# Patient Record
Sex: MALE | URBAN - METROPOLITAN AREA
[De-identification: names, ages, dates, MRNs, and addresses within clinical notes are randomized per-mention and may not be internally consistent; named-entity substitution may affect disease eponyms.]

---

## 2024-03-14 ENCOUNTER — TELEPHONE (OUTPATIENT)
Dept: CARDIAC SURGERY | Facility: CLINIC | Age: 79
End: 2024-03-14

## 2024-03-14 RX ORDER — GLIPIZIDE 5 MG/1
5 TABLET ORAL
COMMUNITY

## 2024-03-14 RX ORDER — FUROSEMIDE 40 MG/1
40 TABLET ORAL 2 TIMES DAILY
COMMUNITY

## 2024-03-14 RX ORDER — POTASSIUM CHLORIDE 750 MG/1
10 TABLET, FILM COATED, EXTENDED RELEASE ORAL DAILY
COMMUNITY

## 2024-03-14 RX ORDER — ASPIRIN 81 MG/1
81 TABLET, CHEWABLE ORAL DAILY
COMMUNITY

## 2024-03-14 RX ORDER — ATORVASTATIN CALCIUM 40 MG/1
40 TABLET, FILM COATED ORAL DAILY
COMMUNITY

## 2024-03-14 RX ORDER — GABAPENTIN 300 MG/1
300 CAPSULE ORAL 2 TIMES DAILY
COMMUNITY

## 2024-03-14 RX ORDER — PANTOPRAZOLE SODIUM 40 MG/1
40 TABLET, DELAYED RELEASE ORAL DAILY
COMMUNITY

## 2024-03-14 RX ORDER — METOPROLOL SUCCINATE 25 MG/1
25 TABLET, EXTENDED RELEASE ORAL DAILY
COMMUNITY

## 2024-03-14 RX ORDER — MECLIZINE HYDROCHLORIDE 25 MG/1
25 TABLET ORAL 3 TIMES DAILY PRN
COMMUNITY

## 2024-03-14 RX ORDER — TAMSULOSIN HYDROCHLORIDE 0.4 MG/1
0.4 CAPSULE ORAL 2 TIMES DAILY
COMMUNITY

## 2024-03-14 RX ORDER — FINASTERIDE 5 MG/1
5 TABLET, FILM COATED ORAL DAILY
COMMUNITY

## 2024-03-21 ENCOUNTER — OFFICE VISIT (OUTPATIENT)
Dept: CARDIAC SURGERY | Facility: CLINIC | Age: 79
End: 2024-03-21

## 2024-03-21 VITALS
SYSTOLIC BLOOD PRESSURE: 129 MMHG | HEIGHT: 73 IN | BODY MASS INDEX: 27.9 KG/M2 | TEMPERATURE: 97.8 F | WEIGHT: 210.5 LBS | OXYGEN SATURATION: 100 % | DIASTOLIC BLOOD PRESSURE: 60 MMHG | HEART RATE: 56 BPM

## 2024-03-21 DIAGNOSIS — Z71.89 ENCOUNTER FOR DISCUSSION REGARDING IMPLANTABLE CARDIOVERTER-DEFIBRILLATOR (ICD): ICD-10-CM

## 2024-03-21 DIAGNOSIS — Z95.0 CARDIAC PACEMAKER: ICD-10-CM

## 2024-03-21 DIAGNOSIS — I50.20 HFREF (HEART FAILURE WITH REDUCED EJECTION FRACTION) (HCC): Primary | ICD-10-CM

## 2024-03-21 DIAGNOSIS — Z01.810 PRE-OPERATIVE CARDIOVASCULAR EXAMINATION: ICD-10-CM

## 2024-03-21 DIAGNOSIS — Z01.812 ENCOUNTER FOR PRE-OPERATIVE LABORATORY TESTING: ICD-10-CM

## 2024-03-21 PROCEDURE — 99204 OFFICE O/P NEW MOD 45 MIN: CPT | Performed by: THORACIC SURGERY (CARDIOTHORACIC VASCULAR SURGERY)

## 2024-03-21 RX ORDER — CHLORHEXIDINE GLUCONATE ORAL RINSE 1.2 MG/ML
15 SOLUTION DENTAL ONCE
OUTPATIENT
Start: 2024-03-21 | End: 2024-03-21

## 2024-03-21 RX ORDER — CEFAZOLIN SODIUM 2 G/50ML
2000 SOLUTION INTRAVENOUS ONCE
OUTPATIENT
Start: 2024-03-21 | End: 2024-03-21

## 2024-03-21 NOTE — PROGRESS NOTES
Consultation - Cardiothoracic Surgery   Rey Sweeney 78 y.o. male MRN: [id number]    Physician Requesting Consult: Dr. Rosario, Dr. Osei Velasco     Reason for Consult / Principal Problem: Lead extraction, device upgrade.     History of Present Illness: Rey Sweeney is a 78 y.o. year old male with PMHx Afib (low burden of Afib on PPM, therefore not on AC), HTN, HLD, CKD3 (follows with Nephrology in [place]), severe aortic stenosis, s/p AVR and MVR in 2017 at [facility], Type 2 DM, GERD, CHB s/p PPM placement in 2017, s/p generator change in April 2023 due to battery at EOL, dilated cardiomyopathy previously well compensated with an EF of 40-45%, now significantly reduced at 25-30% over two years, who has been referred for device upgrade to BiV ICD. Patient is followed closely by his cardiologist at [place], Dr. Velasco, and as he has had a reduced EF, there have been discussions about upgrading his device. Patient was previously undergoing radiation treatment and hormone therapy for his prostate cancer, which finished at the end of 2023, so he wanted to wait until that was completed.     Upon examination today, patient confirms above details. Currently he states that he experiences dizziness on occasion with position changes and lower extremity edema. He has been trialed on Entresto, but this was stopped due to patient's side effects. He is now on Furosemide, which has improved his symptoms of shortness of breath and lower extremity swelling. He lives in [place], and gets the majority of his care in that area and the [facility]. He lives in a home with his step-son. He states that he lost his wife to pancreatic cancer about 3 years ago. He drives and performs his own ADL's, walks without assistance. He is a retired . He is compliant with his medical follow ups and his medication regimen.       Past Medical History:  Past Medical History:   Diagnosis Date    Atrial fibrillation  (HCC)     Cancer (HCC)     Diabetes (HCC)     Diabetes mellitus (HCC)     Dilated cardiomyopathy (HCC)     GERD (gastroesophageal reflux disease)     Hyperlipidemia     Hypertension     Kidney disease     Paroxysmal tachycardia (HCC)     Prostate cancer (HCC)     Severe aortic stenosis          Past Surgical History:   Past Surgical History:   Procedure Laterality Date    AORTIC VALVE REPLACEMENT  2017    CARDIAC CATHETERIZATION Right 2017    CARDIAC PACEMAKER PLACEMENT      MITRAL VALVE REPLACEMENT  2017         Family History:  Family History   Problem Relation Age of Onset    Cancer Mother     Heart Valve Disease Mother          Social History:      Social History     Substance and Sexual Activity   Alcohol Use Never     Social History     Substance and Sexual Activity   Drug Use Never     Social History     Tobacco Use   Smoking Status Former    Types: Cigarettes   Smokeless Tobacco Not on file       Home Medications:   Prior to Admission medications    Medication Sig Start Date End Date Taking? Authorizing Provider   aspirin 81 mg chewable tablet Chew 81 mg daily   Yes Historical Provider, MD   atorvastatin (LIPITOR) 40 mg tablet Take 40 mg by mouth daily   Yes Historical Provider, MD   finasteride (PROSCAR) 5 mg tablet Take 5 mg by mouth daily   Yes Historical Provider, MD   furosemide (LASIX) 40 mg tablet Take 40 mg by mouth 2 (two) times a day   Yes Historical Provider, MD   gabapentin (NEURONTIN) 300 mg capsule Take 300 mg by mouth 2 (two) times a day   Yes Historical Provider, MD   glipiZIDE (GLUCOTROL) 5 mg tablet Take 5 mg by mouth 2 (two) times a day before meals   Yes Historical Provider, MD   metoprolol succinate (TOPROL-XL) 25 mg 24 hr tablet Take 25 mg by mouth daily   Yes Historical Provider, MD   Multiple Vitamins-Minerals (MULTIVITAMIN MEN 50+ PO) Take by mouth   Yes Historical Provider, MD   pantoprazole (PROTONIX) 40 mg tablet Take 40 mg by mouth daily   Yes Historical Provider, MD   potassium  "chloride (Klor-Con) 10 mEq tablet Take 10 mEq by mouth daily   Yes Historical Provider, MD   tamsulosin (FLOMAX) 0.4 mg Take 0.4 mg by mouth 2 (two) times a day   Yes Historical Provider, MD   meclizine (ANTIVERT) 25 mg tablet Take 25 mg by mouth 3 (three) times a day as needed for dizziness  Patient not taking: Reported on 3/21/2024    Historical Provider, MD       Allergies:  Allergies   Allergen Reactions    Penicillins Nasal Congestion     Gets a stuffy nose and cold like symptom        Review of Systems:     Review of Systems   Constitutional: Negative.  Negative for chills and fever.   HENT:  Negative for ear pain and sore throat.    Eyes: Negative.  Negative for pain and visual disturbance.   Respiratory:  Negative for cough and shortness of breath.    Cardiovascular:  Positive for leg swelling. Negative for chest pain and palpitations.   Gastrointestinal:  Negative for abdominal pain and vomiting.   Genitourinary:  Negative for dysuria and hematuria.   Musculoskeletal:  Negative for arthralgias and back pain.   Skin:  Negative for color change and rash.   Neurological:  Positive for dizziness and light-headedness. Negative for seizures and syncope.   All other systems reviewed and are negative.      Vital Signs:     Vitals:    03/21/24 1024 03/21/24 1027   BP: 134/69 129/60   BP Location: Right arm Left arm   Patient Position: Sitting Sitting   Cuff Size: Standard Standard   Pulse: 56    Temp: 97.8 °F (36.6 °C)    TempSrc: Tympanic    SpO2: 100%    Weight: 95.5 kg (210 lb 8 oz)    Height: 6' 1\" (1.854 m)        Physical Exam:    Physical Exam  Vitals reviewed.   Constitutional:       Appearance: Normal appearance.   HENT:      Head: Normocephalic and atraumatic.   Eyes:      Pupils: Pupils are equal, round, and reactive to light.   Neck:      Vascular: No carotid bruit or JVD.   Cardiovascular:      Rate and Rhythm: Normal rate and regular rhythm.      Pulses:           Carotid pulses are 2+ on the right " "side and 2+ on the left side.       Radial pulses are 2+ on the right side and 2+ on the left side.        Dorsalis pedis pulses are 2+ on the right side and 2+ on the left side.      Heart sounds: Normal heart sounds. No murmur heard.     No friction rub. No gallop.   Pulmonary:      Effort: Pulmonary effort is normal.      Breath sounds: Normal breath sounds. No wheezing, rhonchi or rales.   Abdominal:      Palpations: Abdomen is soft.      Tenderness: There is no abdominal tenderness.   Musculoskeletal:      Cervical back: Normal range of motion.      Right lower le+ Edema present.      Left lower le+ Edema present.   Skin:     General: Skin is warm and dry.      Capillary Refill: Capillary refill takes less than 2 seconds.   Neurological:      General: No focal deficit present.      Mental Status: He is alert.      Sensory: Sensation is intact.   Psychiatric:         Attention and Perception: Attention normal.         Mood and Affect: Mood normal.         Behavior: Behavior normal. Behavior is cooperative.         Lab Results:               Invalid input(s): \"LABGLOM\"      No results found for: \"HGBA1C\"  No results found for: \"CKTOTAL\", \"CKMB\", \"CKMBINDEX\", \"TROPONINI\"    Imaging Studies:     Echocardiogram: 23 @ [facility]   EF 25-30%  Prosthetic mitral valve with normal function   Normally functioning bioprosthetic aortic valve without aortic regurgitation  Mild tricuspid regurgitation   Pacer wire visualized in RA and RV  Aortic root normal     I have personally reviewed pertinent reports.  I have also reviewed records from Dr. Lofton at [facility], echocardiograms and EKG's.       Assessment:  History of AVR and MV repair  PAF - not on AC   PPM in situ, s/p generator change 2023  Cardiomyopathy, need for PPM upgrade to BiV ICD.     Plan:  Risks and benefits of lead extraction, BiV ICD upgrade were discussed in detail today with the patient.  They understand and wish " to proceed with further workup and ultimately surgical intervention.  We have ordered routine preoperative laboratory and vascular studies.  Pending the results of these tests, they will be scheduled for surgery on 4/5/24 with Dr. Trell Quezada M.D.    Rey Sweeney was comfortable with our recommendations, and their questions were answered to their satisfaction.  Thank you for allowing us to participate in the care of this patient.     The patient recently had a screening colonoscopy in 2023.  Therefore GI referral is not indicated at this time.     SIGNATURE: Ria Gutierrez PA-C  DATE: 03/21/24  TIME: 10:32 AM

## 2024-03-21 NOTE — H&P
Preop History and Physical - Cardiothoracic Surgery   Rey Sweeney 78 y.o. male MRN: [id number]    Physician Requesting Consult: Dr. Rosario, Dr. Osei Vealsco     Reason for Consult / Principal Problem: Lead extraction, device upgrade.     History of Present Illness: Rey Sweeney is a 78 y.o. year old male with PMHx Afib (low burden of Afib on PPM, therefore not on AC), HTN, HLD, CKD3 (follows with Nephrology in [place]), severe aortic stenosis, s/p AVR and MVR in 2017 at [facility], Type 2 DM, GERD, CHB s/p PPM placement in 2017, s/p generator change in April 2023 due to battery at EOL, dilated cardiomyopathy previously well compensated with an EF of 40-45%, now significantly reduced at 25-30% over two years, who has been referred for device upgrade to BiV ICD. Patient is followed closely by his cardiologist at [place], Dr. Velasco, and as he has had a reduced EF, there have been discussions about upgrading his device. Patient was previously undergoing radiation treatment and hormone therapy for his prostate cancer, which finished at the end of 2023, so he wanted to wait until that was completed.     Upon examination today, patient confirms above details. Currently he states that he experiences dizziness on occasion with position changes and lower extremity edema. He has been trialed on Entresto, but this was stopped due to patient's side effects. He is now on Furosemide, which has improved his symptoms of shortness of breath and lower extremity swelling. He lives in [place], and gets the majority of his care in that area and the [facility]. He lives in a home with his step-son. He states that he lost his wife to pancreatic cancer about 3 years ago. He drives and performs his own ADL's, walks without assistance. He is a retired . He is compliant with his medical follow ups and his medication regimen.       Past Medical History:  Past Medical History:   Diagnosis Date    Atrial  fibrillation (HCC)     Cancer (HCC)     Diabetes (HCC)     Diabetes mellitus (HCC)     Dilated cardiomyopathy (HCC)     GERD (gastroesophageal reflux disease)     Hyperlipidemia     Hypertension     Kidney disease     Paroxysmal tachycardia (HCC)     Prostate cancer (HCC)     Severe aortic stenosis          Past Surgical History:   Past Surgical History:   Procedure Laterality Date    AORTIC VALVE REPLACEMENT  2017    CARDIAC CATHETERIZATION Right 2017    CARDIAC PACEMAKER PLACEMENT      MITRAL VALVE REPLACEMENT  2017         Family History:  Family History   Problem Relation Age of Onset    Cancer Mother     Heart Valve Disease Mother          Social History:      Social History     Substance and Sexual Activity   Alcohol Use Never     Social History     Substance and Sexual Activity   Drug Use Never     Social History     Tobacco Use   Smoking Status Former    Types: Cigarettes   Smokeless Tobacco Not on file       Home Medications:   Prior to Admission medications    Medication Sig Start Date End Date Taking? Authorizing Provider   aspirin 81 mg chewable tablet Chew 81 mg daily   Yes Historical Provider, MD   atorvastatin (LIPITOR) 40 mg tablet Take 40 mg by mouth daily   Yes Historical Provider, MD   finasteride (PROSCAR) 5 mg tablet Take 5 mg by mouth daily   Yes Historical Provider, MD   furosemide (LASIX) 40 mg tablet Take 40 mg by mouth 2 (two) times a day   Yes Historical Provider, MD   gabapentin (NEURONTIN) 300 mg capsule Take 300 mg by mouth 2 (two) times a day   Yes Historical Provider, MD   glipiZIDE (GLUCOTROL) 5 mg tablet Take 5 mg by mouth 2 (two) times a day before meals   Yes Historical Provider, MD   metoprolol succinate (TOPROL-XL) 25 mg 24 hr tablet Take 25 mg by mouth daily   Yes Historical Provider, MD   Multiple Vitamins-Minerals (MULTIVITAMIN MEN 50+ PO) Take by mouth   Yes Historical Provider, MD   pantoprazole (PROTONIX) 40 mg tablet Take 40 mg by mouth daily   Yes Historical Provider, MD  "  potassium chloride (Klor-Con) 10 mEq tablet Take 10 mEq by mouth daily   Yes Historical Provider, MD   tamsulosin (FLOMAX) 0.4 mg Take 0.4 mg by mouth 2 (two) times a day   Yes Historical Provider, MD   meclizine (ANTIVERT) 25 mg tablet Take 25 mg by mouth 3 (three) times a day as needed for dizziness  Patient not taking: Reported on 3/21/2024    Historical Provider, MD       Allergies:  Allergies   Allergen Reactions    Penicillins Nasal Congestion     Gets a stuffy nose and cold like symptom        Review of Systems:     Review of Systems   Constitutional: Negative.  Negative for chills and fever.   HENT:  Negative for ear pain and sore throat.    Eyes: Negative.  Negative for pain and visual disturbance.   Respiratory:  Negative for cough and shortness of breath.    Cardiovascular:  Positive for leg swelling. Negative for chest pain and palpitations.   Gastrointestinal:  Negative for abdominal pain and vomiting.   Genitourinary:  Negative for dysuria and hematuria.   Musculoskeletal:  Negative for arthralgias and back pain.   Skin:  Negative for color change and rash.   Neurological:  Positive for dizziness and light-headedness. Negative for seizures and syncope.   All other systems reviewed and are negative.      Vital Signs:     Vitals:    03/21/24 1024 03/21/24 1027   BP: 134/69 129/60   BP Location: Right arm Left arm   Patient Position: Sitting Sitting   Cuff Size: Standard Standard   Pulse: 56    Temp: 97.8 °F (36.6 °C)    TempSrc: Tympanic    SpO2: 100%    Weight: 95.5 kg (210 lb 8 oz)    Height: 6' 1\" (1.854 m)        Physical Exam:    Physical Exam  Vitals reviewed.   Constitutional:       Appearance: Normal appearance.   HENT:      Head: Normocephalic and atraumatic.   Eyes:      Pupils: Pupils are equal, round, and reactive to light.   Neck:      Vascular: No carotid bruit or JVD.   Cardiovascular:      Rate and Rhythm: Normal rate and regular rhythm.      Pulses:           Carotid pulses are 2+ on " "the right side and 2+ on the left side.       Radial pulses are 2+ on the right side and 2+ on the left side.        Dorsalis pedis pulses are 2+ on the right side and 2+ on the left side.      Heart sounds: Normal heart sounds. No murmur heard.     No friction rub. No gallop.   Pulmonary:      Effort: Pulmonary effort is normal.      Breath sounds: Normal breath sounds. No wheezing, rhonchi or rales.   Abdominal:      Palpations: Abdomen is soft.      Tenderness: There is no abdominal tenderness.   Musculoskeletal:      Cervical back: Normal range of motion.      Right lower le+ Edema present.      Left lower le+ Edema present.   Skin:     General: Skin is warm and dry.      Capillary Refill: Capillary refill takes less than 2 seconds.   Neurological:      General: No focal deficit present.      Mental Status: He is alert.      Sensory: Sensation is intact.   Psychiatric:         Attention and Perception: Attention normal.         Mood and Affect: Mood normal.         Behavior: Behavior normal. Behavior is cooperative.         Lab Results:               Invalid input(s): \"LABGLOM\"      No results found for: \"HGBA1C\"  No results found for: \"CKTOTAL\", \"CKMB\", \"CKMBINDEX\", \"TROPONINI\"    Imaging Studies:     Echocardiogram: 23 @ [facility]   EF 25-30%  Prosthetic mitral valve with normal function   Normally functioning bioprosthetic aortic valve without aortic regurgitation  Mild tricuspid regurgitation   Pacer wire visualized in RA and RV  Aortic root normal     I have personally reviewed pertinent reports.  I have also reviewed records from Dr. Lofton at [facility], echocardiograms and EKG's.       Assessment:  History of AVR and MV repair  PAF - not on AC   PPM in situ, s/p generator change 2023  Cardiomyopathy, need for PPM upgrade to BiV ICD.     Plan:  Risks and benefits of lead extraction, BiV ICD upgrade were discussed in detail today with the patient.  They understand " and wish to proceed with further workup and ultimately surgical intervention.  We have ordered routine preoperative laboratory and vascular studies.  Pending the results of these tests, they will be scheduled for surgery on 4/5/24 with Dr. Trell Quezada M.D.    Rey Sweeney was comfortable with our recommendations, and their questions were answered to their satisfaction.  Thank you for allowing us to participate in the care of this patient.     The patient recently had a screening colonoscopy in 2023.  Therefore GI referral is not indicated at this time.     SIGNATURE: Ria Gutierrez PA-C  DATE: 03/21/24  TIME: 10:32 AM

## 2024-03-21 NOTE — LETTER
March 21, 2024     Goe Rosario DO  [address] [facility] [address]    Patient: Rey Sweeney   YOB: 1945   Date of Visit: 3/21/2024       Dear Dr. Rosario:    Thank you for referring Rey Sweeney to me for evaluation. Below are my notes for this consultation.    If you have questions, please do not hesitate to call me. I look forward to following your patient along with you.         Sincerely,        Trell Quezada MD        CC: No Recipients    Ria Gutierrez PA-C  3/21/2024 12:11 PM  Attested  Consultation - Cardiothoracic Surgery   Rey Sweeney 78 y.o. male MRN: [id number]    Physician Requesting Consult: Dr. Rosario, Dr. Osei Velasco     Reason for Consult / Principal Problem: Lead extraction, device upgrade.     History of Present Illness: Rey Sweeney is a 78 y.o. year old male with PMHx Afib (low burden of Afib on PPM, therefore not on AC), HTN, HLD, CKD3 (follows with Nephrology in [place]), severe aortic stenosis, s/p AVR and MVR in 2017 at , Type 2 DM, GERD, CHB s/p PPM placement in 2017, s/p generator change in April 2023 due to battery at EOL, dilated cardiomyopathy previously well compensated with an EF of 40-45%, now significantly reduced at 25-30% over two years, who has been referred for device upgrade to BiV ICD. Patient is followed closely by his cardiologist at [place], Dr. Velasco, and as he has had a reduced EF, there have been discussions about upgrading his device. Patient was previously undergoing radiation treatment and hormone therapy for his prostate cancer, which finished at the end of 2023, so he wanted to wait until that was completed.     Upon examination today, patient confirms above details. Currently he states that he experiences dizziness on occasion with position changes and lower extremity edema. He has been trialed on Entresto, but this was stopped due to patient's side effects. He is now on Furosemide, which has improved  his symptoms of shortness of breath and lower extremity swelling. He lives in [place], and gets the majority of his care in that area and the [facility]. He lives in a home with his step-son. He states that he lost his wife to pancreatic cancer about 3 years ago. He drives and performs his own ADL's, walks without assistance. He is a retired . He is compliant with his medical follow ups and his medication regimen.       Past Medical History:  Past Medical History:   Diagnosis Date   • Atrial fibrillation (HCC)    • Cancer (HCC)    • Diabetes (HCC)    • Diabetes mellitus (HCC)    • Dilated cardiomyopathy (HCC)    • GERD (gastroesophageal reflux disease)    • Hyperlipidemia    • Hypertension    • Kidney disease    • Paroxysmal tachycardia (HCC)    • Prostate cancer (HCC)    • Severe aortic stenosis          Past Surgical History:   Past Surgical History:   Procedure Laterality Date   • AORTIC VALVE REPLACEMENT  2017   • CARDIAC CATHETERIZATION Right 2017   • CARDIAC PACEMAKER PLACEMENT     • MITRAL VALVE REPLACEMENT  2017         Family History:  Family History   Problem Relation Age of Onset   • Cancer Mother    • Heart Valve Disease Mother          Social History:      Social History     Substance and Sexual Activity   Alcohol Use Never     Social History     Substance and Sexual Activity   Drug Use Never     Social History     Tobacco Use   Smoking Status Former   • Types: Cigarettes   Smokeless Tobacco Not on file       Home Medications:   Prior to Admission medications    Medication Sig Start Date End Date Taking? Authorizing Provider   aspirin 81 mg chewable tablet Chew 81 mg daily   Yes Historical Provider, MD   atorvastatin (LIPITOR) 40 mg tablet Take 40 mg by mouth daily   Yes Historical Provider, MD   finasteride (PROSCAR) 5 mg tablet Take 5 mg by mouth daily   Yes Historical Provider, MD   furosemide (LASIX) 40 mg tablet Take 40 mg by mouth 2 (two) times a day   Yes Historical Provider, MD    gabapentin (NEURONTIN) 300 mg capsule Take 300 mg by mouth 2 (two) times a day   Yes Historical Provider, MD   glipiZIDE (GLUCOTROL) 5 mg tablet Take 5 mg by mouth 2 (two) times a day before meals   Yes Historical Provider, MD   metoprolol succinate (TOPROL-XL) 25 mg 24 hr tablet Take 25 mg by mouth daily   Yes Historical Provider, MD   Multiple Vitamins-Minerals (MULTIVITAMIN MEN 50+ PO) Take by mouth   Yes Historical Provider, MD   pantoprazole (PROTONIX) 40 mg tablet Take 40 mg by mouth daily   Yes Historical Provider, MD   potassium chloride (Klor-Con) 10 mEq tablet Take 10 mEq by mouth daily   Yes Historical Provider, MD   tamsulosin (FLOMAX) 0.4 mg Take 0.4 mg by mouth 2 (two) times a day   Yes Historical Provider, MD   meclizine (ANTIVERT) 25 mg tablet Take 25 mg by mouth 3 (three) times a day as needed for dizziness  Patient not taking: Reported on 3/21/2024    Historical Provider, MD       Allergies:  Allergies   Allergen Reactions   • Penicillins Nasal Congestion     Gets a stuffy nose and cold like symptom        Review of Systems:     Review of Systems   Constitutional: Negative.  Negative for chills and fever.   HENT:  Negative for ear pain and sore throat.    Eyes: Negative.  Negative for pain and visual disturbance.   Respiratory:  Negative for cough and shortness of breath.    Cardiovascular:  Positive for leg swelling. Negative for chest pain and palpitations.   Gastrointestinal:  Negative for abdominal pain and vomiting.   Genitourinary:  Negative for dysuria and hematuria.   Musculoskeletal:  Negative for arthralgias and back pain.   Skin:  Negative for color change and rash.   Neurological:  Positive for dizziness and light-headedness. Negative for seizures and syncope.   All other systems reviewed and are negative.      Vital Signs:     Vitals:    03/21/24 1024 03/21/24 1027   BP: 134/69 129/60   BP Location: Right arm Left arm   Patient Position: Sitting Sitting   Cuff Size: Standard Standard  "  Pulse: 56    Temp: 97.8 °F (36.6 °C)    TempSrc: Tympanic    SpO2: 100%    Weight: 95.5 kg (210 lb 8 oz)    Height: 6' 1\" (1.854 m)        Physical Exam:    Physical Exam  Vitals reviewed.   Constitutional:       Appearance: Normal appearance.   HENT:      Head: Normocephalic and atraumatic.   Eyes:      Pupils: Pupils are equal, round, and reactive to light.   Neck:      Vascular: No carotid bruit or JVD.   Cardiovascular:      Rate and Rhythm: Normal rate and regular rhythm.      Pulses:           Carotid pulses are 2+ on the right side and 2+ on the left side.       Radial pulses are 2+ on the right side and 2+ on the left side.        Dorsalis pedis pulses are 2+ on the right side and 2+ on the left side.      Heart sounds: Normal heart sounds. No murmur heard.     No friction rub. No gallop.   Pulmonary:      Effort: Pulmonary effort is normal.      Breath sounds: Normal breath sounds. No wheezing, rhonchi or rales.   Abdominal:      Palpations: Abdomen is soft.      Tenderness: There is no abdominal tenderness.   Musculoskeletal:      Cervical back: Normal range of motion.      Right lower le+ Edema present.      Left lower le+ Edema present.   Skin:     General: Skin is warm and dry.      Capillary Refill: Capillary refill takes less than 2 seconds.   Neurological:      General: No focal deficit present.      Mental Status: He is alert.      Sensory: Sensation is intact.   Psychiatric:         Attention and Perception: Attention normal.         Mood and Affect: Mood normal.         Behavior: Behavior normal. Behavior is cooperative.         Lab Results:               Invalid input(s): \"LABGLOM\"      No results found for: \"HGBA1C\"  No results found for: \"CKTOTAL\", \"CKMB\", \"CKMBINDEX\", \"TROPONINI\"    Imaging Studies:     Echocardiogram: 23 @ [facility]   EF 25-30%  Prosthetic mitral valve with normal function   Normally functioning bioprosthetic aortic valve without aortic " regurgitation  Mild tricuspid regurgitation   Pacer wire visualized in RA and RV  Aortic root normal     I have personally reviewed pertinent reports.  I have also reviewed records from Dr. Lofton at [facility], echocardiograms and EKG's.       Assessment:  History of AVR and MV repair  PAF - not on AC   PPM in situ, s/p generator change April 2023  Cardiomyopathy, need for PPM upgrade to BiV ICD.     Plan:  Risks and benefits of lead extraction, BiV ICD upgrade were discussed in detail today with the patient.  They understand and wish to proceed with further workup and ultimately surgical intervention.  We have ordered routine preoperative laboratory and vascular studies.  Pending the results of these tests, they will be scheduled for surgery on 4/5/24 with Dr. Trell Quezada M.D.    Rey Sweeney was comfortable with our recommendations, and their questions were answered to their satisfaction.  Thank you for allowing us to participate in the care of this patient.     The patient recently had a screening colonoscopy in 2023.  Therefore GI referral is not indicated at this time.     SIGNATURE: Ria Gutierrez PA-C  DATE: 03/21/24  TIME: 10:32 AM      Attestation signed by Trell Quezada MD at 3/21/2024 12:33 PM:  Attending Attestation:    I supervised the Advanced Practitioner.  I discussed the case with the Advanced Practitioner, reviewed the note and agree.    The patient is a 78-year-old man with past medical history for PPM placement 2017 for heart block after AVR and MVR.  He has developed cardiomyopathy with an EF between 25% and 30% and is now in need of an upgrade to a BiV ICD.  He was referred for lead extraction and device upgrade.  I reviewed the diagnosis with the patient and the indication for the procedure, the benefits, risk and possible complications.  He understands and agrees to proceed with surgery.  He will return for an elective lead extraction and BiV ICD upgrade.

## 2024-03-21 NOTE — H&P (VIEW-ONLY)
Preop History and Physical - Cardiothoracic Surgery   Rey Sweeney 78 y.o. male MRN: [id number]    Physician Requesting Consult: Dr. Rosario, Dr. Osei Velasco     Reason for Consult / Principal Problem: Lead extraction, device upgrade.     History of Present Illness: Rey Sweeney is a 78 y.o. year old male with PMHx Afib (low burden of Afib on PPM, therefore not on AC), HTN, HLD, CKD3 (follows with Nephrology in [place]), severe aortic stenosis, s/p AVR and MVR in 2017 at [facility], Type 2 DM, GERD, CHB s/p PPM placement in 2017, s/p generator change in April 2023 due to battery at EOL, dilated cardiomyopathy previously well compensated with an EF of 40-45%, now significantly reduced at 25-30% over two years, who has been referred for device upgrade to BiV ICD. Patient is followed closely by his cardiologist at [place], Dr. Velasco, and as he has had a reduced EF, there have been discussions about upgrading his device. Patient was previously undergoing radiation treatment and hormone therapy for his prostate cancer, which finished at the end of 2023, so he wanted to wait until that was completed.     Upon examination today, patient confirms above details. Currently he states that he experiences dizziness on occasion with position changes and lower extremity edema. He has been trialed on Entresto, but this was stopped due to patient's side effects. He is now on Furosemide, which has improved his symptoms of shortness of breath and lower extremity swelling. He lives in [place], and gets the majority of his care in that area and the [facility]. He lives in a home with his step-son. He states that he lost his wife to pancreatic cancer about 3 years ago. He drives and performs his own ADL's, walks without assistance. He is a retired . He is compliant with his medical follow ups and his medication regimen.       Past Medical History:  Past Medical History:   Diagnosis Date    Atrial  fibrillation (HCC)     Cancer (HCC)     Diabetes (HCC)     Diabetes mellitus (HCC)     Dilated cardiomyopathy (HCC)     GERD (gastroesophageal reflux disease)     Hyperlipidemia     Hypertension     Kidney disease     Paroxysmal tachycardia (HCC)     Prostate cancer (HCC)     Severe aortic stenosis          Past Surgical History:   Past Surgical History:   Procedure Laterality Date    AORTIC VALVE REPLACEMENT  2017    CARDIAC CATHETERIZATION Right 2017    CARDIAC PACEMAKER PLACEMENT      MITRAL VALVE REPLACEMENT  2017         Family History:  Family History   Problem Relation Age of Onset    Cancer Mother     Heart Valve Disease Mother          Social History:      Social History     Substance and Sexual Activity   Alcohol Use Never     Social History     Substance and Sexual Activity   Drug Use Never     Social History     Tobacco Use   Smoking Status Former    Types: Cigarettes   Smokeless Tobacco Not on file       Home Medications:   Prior to Admission medications    Medication Sig Start Date End Date Taking? Authorizing Provider   aspirin 81 mg chewable tablet Chew 81 mg daily   Yes Historical Provider, MD   atorvastatin (LIPITOR) 40 mg tablet Take 40 mg by mouth daily   Yes Historical Provider, MD   finasteride (PROSCAR) 5 mg tablet Take 5 mg by mouth daily   Yes Historical Provider, MD   furosemide (LASIX) 40 mg tablet Take 40 mg by mouth 2 (two) times a day   Yes Historical Provider, MD   gabapentin (NEURONTIN) 300 mg capsule Take 300 mg by mouth 2 (two) times a day   Yes Historical Provider, MD   glipiZIDE (GLUCOTROL) 5 mg tablet Take 5 mg by mouth 2 (two) times a day before meals   Yes Historical Provider, MD   metoprolol succinate (TOPROL-XL) 25 mg 24 hr tablet Take 25 mg by mouth daily   Yes Historical Provider, MD   Multiple Vitamins-Minerals (MULTIVITAMIN MEN 50+ PO) Take by mouth   Yes Historical Provider, MD   pantoprazole (PROTONIX) 40 mg tablet Take 40 mg by mouth daily   Yes Historical Provider, MD  "  potassium chloride (Klor-Con) 10 mEq tablet Take 10 mEq by mouth daily   Yes Historical Provider, MD   tamsulosin (FLOMAX) 0.4 mg Take 0.4 mg by mouth 2 (two) times a day   Yes Historical Provider, MD   meclizine (ANTIVERT) 25 mg tablet Take 25 mg by mouth 3 (three) times a day as needed for dizziness  Patient not taking: Reported on 3/21/2024    Historical Provider, MD       Allergies:  Allergies   Allergen Reactions    Penicillins Nasal Congestion     Gets a stuffy nose and cold like symptom        Review of Systems:     Review of Systems   Constitutional: Negative.  Negative for chills and fever.   HENT:  Negative for ear pain and sore throat.    Eyes: Negative.  Negative for pain and visual disturbance.   Respiratory:  Negative for cough and shortness of breath.    Cardiovascular:  Positive for leg swelling. Negative for chest pain and palpitations.   Gastrointestinal:  Negative for abdominal pain and vomiting.   Genitourinary:  Negative for dysuria and hematuria.   Musculoskeletal:  Negative for arthralgias and back pain.   Skin:  Negative for color change and rash.   Neurological:  Positive for dizziness and light-headedness. Negative for seizures and syncope.   All other systems reviewed and are negative.      Vital Signs:     Vitals:    03/21/24 1024 03/21/24 1027   BP: 134/69 129/60   BP Location: Right arm Left arm   Patient Position: Sitting Sitting   Cuff Size: Standard Standard   Pulse: 56    Temp: 97.8 °F (36.6 °C)    TempSrc: Tympanic    SpO2: 100%    Weight: 95.5 kg (210 lb 8 oz)    Height: 6' 1\" (1.854 m)        Physical Exam:    Physical Exam  Vitals reviewed.   Constitutional:       Appearance: Normal appearance.   HENT:      Head: Normocephalic and atraumatic.   Eyes:      Pupils: Pupils are equal, round, and reactive to light.   Neck:      Vascular: No carotid bruit or JVD.   Cardiovascular:      Rate and Rhythm: Normal rate and regular rhythm.      Pulses:           Carotid pulses are 2+ on " "the right side and 2+ on the left side.       Radial pulses are 2+ on the right side and 2+ on the left side.        Dorsalis pedis pulses are 2+ on the right side and 2+ on the left side.      Heart sounds: Normal heart sounds. No murmur heard.     No friction rub. No gallop.   Pulmonary:      Effort: Pulmonary effort is normal.      Breath sounds: Normal breath sounds. No wheezing, rhonchi or rales.   Abdominal:      Palpations: Abdomen is soft.      Tenderness: There is no abdominal tenderness.   Musculoskeletal:      Cervical back: Normal range of motion.      Right lower le+ Edema present.      Left lower le+ Edema present.   Skin:     General: Skin is warm and dry.      Capillary Refill: Capillary refill takes less than 2 seconds.   Neurological:      General: No focal deficit present.      Mental Status: He is alert.      Sensory: Sensation is intact.   Psychiatric:         Attention and Perception: Attention normal.         Mood and Affect: Mood normal.         Behavior: Behavior normal. Behavior is cooperative.         Lab Results:               Invalid input(s): \"LABGLOM\"      No results found for: \"HGBA1C\"  No results found for: \"CKTOTAL\", \"CKMB\", \"CKMBINDEX\", \"TROPONINI\"    Imaging Studies:     Echocardiogram: 23 @ [facility]   EF 25-30%  Prosthetic mitral valve with normal function   Normally functioning bioprosthetic aortic valve without aortic regurgitation  Mild tricuspid regurgitation   Pacer wire visualized in RA and RV  Aortic root normal     I have personally reviewed pertinent reports.  I have also reviewed records from Dr. Lofton at [facility], echocardiograms and EKG's.       Assessment:  History of AVR and MV repair  PAF - not on AC   PPM in situ, s/p generator change 2023  Cardiomyopathy, need for PPM upgrade to BiV ICD.     Plan:  Risks and benefits of lead extraction, BiV ICD upgrade were discussed in detail today with the patient.  They understand " and wish to proceed with further workup and ultimately surgical intervention.  We have ordered routine preoperative laboratory and vascular studies.  Pending the results of these tests, they will be scheduled for surgery on 4/5/24 with Dr. Trell Quezada M.D.    Rey Sweeney was comfortable with our recommendations, and their questions were answered to their satisfaction.  Thank you for allowing us to participate in the care of this patient.     The patient recently had a screening colonoscopy in 2023.  Therefore GI referral is not indicated at this time.     SIGNATURE: Ria Gutierrez PA-C  DATE: 03/21/24  TIME: 10:32 AM

## 2024-03-29 ENCOUNTER — HOSPITAL ENCOUNTER (OUTPATIENT)
Dept: CT IMAGING | Facility: HOSPITAL | Age: 79
End: 2024-03-29
Payer: MEDICARE

## 2024-03-29 ENCOUNTER — LAB REQUISITION (OUTPATIENT)
Dept: LAB | Facility: HOSPITAL | Age: 79
End: 2024-03-29
Payer: MEDICARE

## 2024-03-29 ENCOUNTER — APPOINTMENT (OUTPATIENT)
Dept: LAB | Facility: HOSPITAL | Age: 79
End: 2024-03-29
Payer: MEDICARE

## 2024-03-29 DIAGNOSIS — I50.20 HFREF (HEART FAILURE WITH REDUCED EJECTION FRACTION) (HCC): ICD-10-CM

## 2024-03-29 DIAGNOSIS — Z01.810 PRE-OPERATIVE CARDIOVASCULAR EXAMINATION: ICD-10-CM

## 2024-03-29 DIAGNOSIS — Z01.812 ENCOUNTER FOR PRE-OPERATIVE LABORATORY TESTING: ICD-10-CM

## 2024-03-29 DIAGNOSIS — Z95.0 CARDIAC PACEMAKER: ICD-10-CM

## 2024-03-29 DIAGNOSIS — Z71.89 ENCOUNTER FOR DISCUSSION REGARDING IMPLANTABLE CARDIOVERTER-DEFIBRILLATOR (ICD): ICD-10-CM

## 2024-03-29 DIAGNOSIS — Z01.812 ENCOUNTER FOR PREPROCEDURAL LABORATORY EXAMINATION: ICD-10-CM

## 2024-03-29 DIAGNOSIS — Z01.818 PRE-OP TESTING: ICD-10-CM

## 2024-03-29 LAB
ABO GROUP BLD: NORMAL
ANION GAP SERPL CALCULATED.3IONS-SCNC: 10 MMOL/L (ref 4–13)
BASOPHILS # BLD AUTO: 0.07 THOUSANDS/ÂΜL (ref 0–0.1)
BASOPHILS NFR BLD AUTO: 1 % (ref 0–1)
BLD GP AB SCN SERPL QL: NEGATIVE
BUN SERPL-MCNC: 42 MG/DL (ref 5–25)
CALCIUM SERPL-MCNC: 9.2 MG/DL (ref 8.4–10.2)
CHLORIDE SERPL-SCNC: 110 MMOL/L (ref 96–108)
CO2 SERPL-SCNC: 24 MMOL/L (ref 21–32)
CREAT SERPL-MCNC: 2.36 MG/DL (ref 0.6–1.3)
EOSINOPHIL # BLD AUTO: 0.47 THOUSAND/ÂΜL (ref 0–0.61)
EOSINOPHIL NFR BLD AUTO: 5 % (ref 0–6)
ERYTHROCYTE [DISTWIDTH] IN BLOOD BY AUTOMATED COUNT: 13.4 % (ref 11.6–15.1)
GFR SERPL CREATININE-BSD FRML MDRD: 25 ML/MIN/1.73SQ M
GLUCOSE P FAST SERPL-MCNC: 129 MG/DL (ref 65–99)
HCT VFR BLD AUTO: 37.5 % (ref 36.5–49.3)
HGB BLD-MCNC: 12.4 G/DL (ref 12–17)
IMM GRANULOCYTES # BLD AUTO: 0.08 THOUSAND/UL (ref 0–0.2)
IMM GRANULOCYTES NFR BLD AUTO: 1 % (ref 0–2)
INR PPP: 0.91 (ref 0.84–1.19)
LYMPHOCYTES # BLD AUTO: 1.7 THOUSANDS/ÂΜL (ref 0.6–4.47)
LYMPHOCYTES NFR BLD AUTO: 16 % (ref 14–44)
MCH RBC QN AUTO: 31.9 PG (ref 26.8–34.3)
MCHC RBC AUTO-ENTMCNC: 33.1 G/DL (ref 31.4–37.4)
MCV RBC AUTO: 96 FL (ref 82–98)
MONOCYTES # BLD AUTO: 0.97 THOUSAND/ÂΜL (ref 0.17–1.22)
MONOCYTES NFR BLD AUTO: 9 % (ref 4–12)
NEUTROPHILS # BLD AUTO: 7.2 THOUSANDS/ÂΜL (ref 1.85–7.62)
NEUTS SEG NFR BLD AUTO: 68 % (ref 43–75)
NRBC BLD AUTO-RTO: 0 /100 WBCS
PLATELET # BLD AUTO: 167 THOUSANDS/UL (ref 149–390)
PMV BLD AUTO: 11.2 FL (ref 8.9–12.7)
POTASSIUM SERPL-SCNC: 4.2 MMOL/L (ref 3.5–5.3)
PROTHROMBIN TIME: 12.9 SECONDS (ref 11.6–14.5)
RBC # BLD AUTO: 3.89 MILLION/UL (ref 3.88–5.62)
RH BLD: POSITIVE
SODIUM SERPL-SCNC: 144 MMOL/L (ref 135–147)
SPECIMEN EXPIRATION DATE: NORMAL
WBC # BLD AUTO: 10.49 THOUSAND/UL (ref 4.31–10.16)

## 2024-03-29 PROCEDURE — 36415 COLL VENOUS BLD VENIPUNCTURE: CPT

## 2024-03-29 PROCEDURE — 86850 RBC ANTIBODY SCREEN: CPT | Performed by: PHYSICIAN ASSISTANT

## 2024-03-29 PROCEDURE — 71250 CT THORAX DX C-: CPT

## 2024-03-29 PROCEDURE — 85610 PROTHROMBIN TIME: CPT

## 2024-03-29 PROCEDURE — 86900 BLOOD TYPING SEROLOGIC ABO: CPT | Performed by: PHYSICIAN ASSISTANT

## 2024-03-29 PROCEDURE — 86901 BLOOD TYPING SEROLOGIC RH(D): CPT | Performed by: PHYSICIAN ASSISTANT

## 2024-03-29 PROCEDURE — 85025 COMPLETE CBC W/AUTO DIFF WBC: CPT

## 2024-03-29 PROCEDURE — 80048 BASIC METABOLIC PNL TOTAL CA: CPT

## 2024-04-04 ENCOUNTER — ANESTHESIA EVENT (OUTPATIENT)
Dept: PERIOP | Facility: HOSPITAL | Age: 79
End: 2024-04-04
Payer: MEDICARE

## 2024-04-05 ENCOUNTER — APPOINTMENT (OUTPATIENT)
Dept: NON INVASIVE DIAGNOSTICS | Facility: HOSPITAL | Age: 79
End: 2024-04-05
Attending: THORACIC SURGERY (CARDIOTHORACIC VASCULAR SURGERY)
Payer: MEDICARE

## 2024-04-05 ENCOUNTER — APPOINTMENT (OUTPATIENT)
Dept: RADIOLOGY | Facility: HOSPITAL | Age: 79
End: 2024-04-05
Payer: MEDICARE

## 2024-04-05 ENCOUNTER — HOSPITAL ENCOUNTER (OUTPATIENT)
Facility: HOSPITAL | Age: 79
Setting detail: OUTPATIENT SURGERY
Discharge: HOME | End: 2024-04-06
Attending: THORACIC SURGERY (CARDIOTHORACIC VASCULAR SURGERY) | Admitting: INTERNAL MEDICINE
Payer: MEDICARE

## 2024-04-05 ENCOUNTER — ANESTHESIA (OUTPATIENT)
Dept: PERIOP | Facility: HOSPITAL | Age: 79
End: 2024-04-05
Payer: MEDICARE

## 2024-04-05 DIAGNOSIS — Z71.89 ENCOUNTER FOR DISCUSSION REGARDING IMPLANTABLE CARDIOVERTER-DEFIBRILLATOR (ICD): ICD-10-CM

## 2024-04-05 DIAGNOSIS — I50.20 HFREF (HEART FAILURE WITH REDUCED EJECTION FRACTION) (HCC): ICD-10-CM

## 2024-04-05 DIAGNOSIS — I48.20 CHRONIC A-FIB (HCC): ICD-10-CM

## 2024-04-05 DIAGNOSIS — Z95.0 CARDIAC PACEMAKER: ICD-10-CM

## 2024-04-05 PROBLEM — Z95.2 HISTORY OF MITRAL VALVE REPLACEMENT: Status: ACTIVE | Noted: 2024-04-05

## 2024-04-05 PROBLEM — I50.9 CHF (CONGESTIVE HEART FAILURE) (HCC): Status: ACTIVE | Noted: 2024-04-05

## 2024-04-05 LAB
ABO GROUP BLD: NORMAL
ATRIAL RATE: 60 BPM
ATRIAL RATE: 60 BPM
GLUCOSE SERPL-MCNC: 166 MG/DL (ref 65–140)
GLUCOSE SERPL-MCNC: 193 MG/DL (ref 65–140)
GLUCOSE SERPL-MCNC: 201 MG/DL (ref 65–140)
GLUCOSE SERPL-MCNC: 294 MG/DL (ref 65–140)
P AXIS: -45 DEGREES
PR INTERVAL: 0 MS
PR INTERVAL: 0 MS
QRS AXIS: 235 DEGREES
QRS AXIS: 237 DEGREES
QRSD INTERVAL: 150 MS
QRSD INTERVAL: 154 MS
QT INTERVAL: 504 MS
QT INTERVAL: 504 MS
QTC INTERVAL: 504 MS
QTC INTERVAL: 504 MS
RH BLD: POSITIVE
T WAVE AXIS: 1 DEGREES
T WAVE AXIS: 3 DEGREES
VENTRICULAR RATE: 60 BPM
VENTRICULAR RATE: 60 BPM

## 2024-04-05 PROCEDURE — C1898 LEAD, PMKR, OTHER THAN TRANS: HCPCS | Performed by: THORACIC SURGERY (CARDIOTHORACIC VASCULAR SURGERY)

## 2024-04-05 PROCEDURE — C1892 INTRO/SHEATH,FIXED,PEEL-AWAY: HCPCS | Performed by: THORACIC SURGERY (CARDIOTHORACIC VASCULAR SURGERY)

## 2024-04-05 PROCEDURE — C1894 INTRO/SHEATH, NON-LASER: HCPCS | Performed by: THORACIC SURGERY (CARDIOTHORACIC VASCULAR SURGERY)

## 2024-04-05 PROCEDURE — 93005 ELECTROCARDIOGRAM TRACING: CPT

## 2024-04-05 PROCEDURE — C1730 CATH, EP, 19 OR FEW ELECT: HCPCS | Performed by: THORACIC SURGERY (CARDIOTHORACIC VASCULAR SURGERY)

## 2024-04-05 PROCEDURE — 71045 X-RAY EXAM CHEST 1 VIEW: CPT

## 2024-04-05 PROCEDURE — 33249 INSJ/RPLCMT DEFIB W/LEAD(S): CPT | Performed by: INTERNAL MEDICINE

## 2024-04-05 PROCEDURE — 33235 REMOVAL PACEMAKER ELECTRODE: CPT | Performed by: THORACIC SURGERY (CARDIOTHORACIC VASCULAR SURGERY)

## 2024-04-05 PROCEDURE — 86923 COMPATIBILITY TEST ELECTRIC: CPT

## 2024-04-05 PROCEDURE — C1887 CATHETER, GUIDING: HCPCS | Performed by: THORACIC SURGERY (CARDIOTHORACIC VASCULAR SURGERY)

## 2024-04-05 PROCEDURE — 93355 ECHO TRANSESOPHAGEAL (TEE): CPT

## 2024-04-05 PROCEDURE — 82948 REAGENT STRIP/BLOOD GLUCOSE: CPT

## 2024-04-05 PROCEDURE — 33225 L VENTRIC PACING LEAD ADD-ON: CPT | Performed by: INTERNAL MEDICINE

## 2024-04-05 PROCEDURE — 93010 ELECTROCARDIOGRAM REPORT: CPT | Performed by: INTERNAL MEDICINE

## 2024-04-05 PROCEDURE — C1900 LEAD, CORONARY VENOUS: HCPCS | Performed by: THORACIC SURGERY (CARDIOTHORACIC VASCULAR SURGERY)

## 2024-04-05 PROCEDURE — 33233 REMOVAL OF PM GENERATOR: CPT | Performed by: THORACIC SURGERY (CARDIOTHORACIC VASCULAR SURGERY)

## 2024-04-05 PROCEDURE — C1769 GUIDE WIRE: HCPCS | Performed by: THORACIC SURGERY (CARDIOTHORACIC VASCULAR SURGERY)

## 2024-04-05 PROCEDURE — C1882 AICD, OTHER THAN SING/DUAL: HCPCS | Performed by: THORACIC SURGERY (CARDIOTHORACIC VASCULAR SURGERY)

## 2024-04-05 PROCEDURE — C1777 LEAD, AICD, ENDO SINGLE COIL: HCPCS | Performed by: THORACIC SURGERY (CARDIOTHORACIC VASCULAR SURGERY)

## 2024-04-05 DEVICE — LEAD 5076-52 MRI US RCMCRD
Type: IMPLANTABLE DEVICE | Site: HEART | Status: FUNCTIONAL
Brand: CAPSUREFIX NOVUS MRI™ SURESCAN®

## 2024-04-05 DEVICE — ENVELOPE CMRM6133 ABSORB LRG MR
Type: IMPLANTABLE DEVICE | Site: CHEST  WALL | Status: FUNCTIONAL
Brand: TYRX™

## 2024-04-05 DEVICE — CRTD DTPA2QQ COBALT XT HF QUAD MRI DF4
Type: IMPLANTABLE DEVICE | Site: HEART | Status: FUNCTIONAL
Brand: COBALT™ XT HF QUAD CRT-D MRI SURESCAN™

## 2024-04-05 DEVICE — LEAD 459888 MRI S-TIP US
Type: IMPLANTABLE DEVICE | Site: HEART | Status: FUNCTIONAL
Brand: ATTAIN PERFORMA™ S MRI SURESCAN™

## 2024-04-05 DEVICE — LEAD 6935M62 QUATTRO SECURE S MRI US
Type: IMPLANTABLE DEVICE | Site: HEART | Status: FUNCTIONAL
Brand: SPRINT QUATTRO SECURE S MRI™ SURESCAN™

## 2024-04-05 RX ORDER — PANTOPRAZOLE SODIUM 40 MG/1
40 TABLET, DELAYED RELEASE ORAL
Status: DISCONTINUED | OUTPATIENT
Start: 2024-04-06 | End: 2024-04-06 | Stop reason: HOSPADM

## 2024-04-05 RX ORDER — LIDOCAINE HYDROCHLORIDE 10 MG/ML
INJECTION, SOLUTION EPIDURAL; INFILTRATION; INTRACAUDAL; PERINEURAL AS NEEDED
Status: DISCONTINUED | OUTPATIENT
Start: 2024-04-05 | End: 2024-04-05

## 2024-04-05 RX ORDER — ASPIRIN 81 MG/1
81 TABLET, CHEWABLE ORAL DAILY
Status: DISCONTINUED | OUTPATIENT
Start: 2024-04-06 | End: 2024-04-06 | Stop reason: HOSPADM

## 2024-04-05 RX ORDER — TAMSULOSIN HYDROCHLORIDE 0.4 MG/1
0.4 CAPSULE ORAL 2 TIMES DAILY
Status: DISCONTINUED | OUTPATIENT
Start: 2024-04-05 | End: 2024-04-06 | Stop reason: HOSPADM

## 2024-04-05 RX ORDER — GABAPENTIN 300 MG/1
300 CAPSULE ORAL 2 TIMES DAILY
Status: DISCONTINUED | OUTPATIENT
Start: 2024-04-05 | End: 2024-04-06 | Stop reason: HOSPADM

## 2024-04-05 RX ORDER — HYDROMORPHONE HCL IN WATER/PF 6 MG/30 ML
0.2 PATIENT CONTROLLED ANALGESIA SYRINGE INTRAVENOUS
Status: DISCONTINUED | OUTPATIENT
Start: 2024-04-05 | End: 2024-04-05 | Stop reason: HOSPADM

## 2024-04-05 RX ORDER — SODIUM CHLORIDE, SODIUM LACTATE, POTASSIUM CHLORIDE, CALCIUM CHLORIDE 600; 310; 30; 20 MG/100ML; MG/100ML; MG/100ML; MG/100ML
INJECTION, SOLUTION INTRAVENOUS CONTINUOUS PRN
Status: DISCONTINUED | OUTPATIENT
Start: 2024-04-05 | End: 2024-04-05

## 2024-04-05 RX ORDER — CHLORHEXIDINE GLUCONATE ORAL RINSE 1.2 MG/ML
15 SOLUTION DENTAL ONCE
Status: COMPLETED | OUTPATIENT
Start: 2024-04-05 | End: 2024-04-05

## 2024-04-05 RX ORDER — EMPAGLIFLOZIN 10 MG/1
10 TABLET, FILM COATED ORAL DAILY
COMMUNITY
Start: 2024-03-26

## 2024-04-05 RX ORDER — NEOSTIGMINE METHYLSULFATE 1 MG/ML
INJECTION INTRAVENOUS AS NEEDED
Status: DISCONTINUED | OUTPATIENT
Start: 2024-04-05 | End: 2024-04-05

## 2024-04-05 RX ORDER — ONDANSETRON 2 MG/ML
INJECTION INTRAMUSCULAR; INTRAVENOUS AS NEEDED
Status: DISCONTINUED | OUTPATIENT
Start: 2024-04-05 | End: 2024-04-05

## 2024-04-05 RX ORDER — INSULIN LISPRO 100 [IU]/ML
1-5 INJECTION, SOLUTION INTRAVENOUS; SUBCUTANEOUS
Status: DISCONTINUED | OUTPATIENT
Start: 2024-04-05 | End: 2024-04-06 | Stop reason: HOSPADM

## 2024-04-05 RX ORDER — CEFAZOLIN SODIUM 1 G/3ML
INJECTION, POWDER, FOR SOLUTION INTRAMUSCULAR; INTRAVENOUS AS NEEDED
Status: DISCONTINUED | OUTPATIENT
Start: 2024-04-05 | End: 2024-04-05

## 2024-04-05 RX ORDER — METOPROLOL SUCCINATE 25 MG/1
25 TABLET, EXTENDED RELEASE ORAL DAILY
Status: DISCONTINUED | OUTPATIENT
Start: 2024-04-06 | End: 2024-04-06 | Stop reason: HOSPADM

## 2024-04-05 RX ORDER — POTASSIUM CHLORIDE 20MEQ/15ML
10 LIQUID (ML) ORAL DAILY
Status: DISCONTINUED | OUTPATIENT
Start: 2024-04-06 | End: 2024-04-06 | Stop reason: HOSPADM

## 2024-04-05 RX ORDER — SODIUM CHLORIDE 9 MG/ML
INJECTION, SOLUTION INTRAVENOUS CONTINUOUS PRN
Status: DISCONTINUED | OUTPATIENT
Start: 2024-04-05 | End: 2024-04-05

## 2024-04-05 RX ORDER — FINASTERIDE 5 MG/1
5 TABLET, FILM COATED ORAL DAILY
Status: DISCONTINUED | OUTPATIENT
Start: 2024-04-06 | End: 2024-04-06 | Stop reason: HOSPADM

## 2024-04-05 RX ORDER — HYDROMORPHONE HCL/PF 1 MG/ML
SYRINGE (ML) INJECTION AS NEEDED
Status: DISCONTINUED | OUTPATIENT
Start: 2024-04-05 | End: 2024-04-05

## 2024-04-05 RX ORDER — DEXAMETHASONE SODIUM PHOSPHATE 10 MG/ML
INJECTION, SOLUTION INTRAMUSCULAR; INTRAVENOUS AS NEEDED
Status: DISCONTINUED | OUTPATIENT
Start: 2024-04-05 | End: 2024-04-05

## 2024-04-05 RX ORDER — GLYCOPYRROLATE 0.2 MG/ML
INJECTION INTRAMUSCULAR; INTRAVENOUS AS NEEDED
Status: DISCONTINUED | OUTPATIENT
Start: 2024-04-05 | End: 2024-04-05

## 2024-04-05 RX ORDER — FENTANYL CITRATE 50 UG/ML
INJECTION, SOLUTION INTRAMUSCULAR; INTRAVENOUS AS NEEDED
Status: DISCONTINUED | OUTPATIENT
Start: 2024-04-05 | End: 2024-04-05

## 2024-04-05 RX ORDER — ONDANSETRON 2 MG/ML
4 INJECTION INTRAMUSCULAR; INTRAVENOUS ONCE AS NEEDED
Status: DISCONTINUED | OUTPATIENT
Start: 2024-04-05 | End: 2024-04-05 | Stop reason: HOSPADM

## 2024-04-05 RX ORDER — CEFAZOLIN SODIUM 2 G/50ML
2000 SOLUTION INTRAVENOUS ONCE
Status: DISCONTINUED | OUTPATIENT
Start: 2024-04-05 | End: 2024-04-05 | Stop reason: HOSPADM

## 2024-04-05 RX ORDER — FUROSEMIDE 40 MG/1
40 TABLET ORAL 2 TIMES DAILY
Status: DISCONTINUED | OUTPATIENT
Start: 2024-04-05 | End: 2024-04-06 | Stop reason: HOSPADM

## 2024-04-05 RX ORDER — ACETAMINOPHEN 325 MG/1
650 TABLET ORAL EVERY 4 HOURS PRN
Status: DISCONTINUED | OUTPATIENT
Start: 2024-04-05 | End: 2024-04-05

## 2024-04-05 RX ORDER — PROPOFOL 10 MG/ML
INJECTION, EMULSION INTRAVENOUS AS NEEDED
Status: DISCONTINUED | OUTPATIENT
Start: 2024-04-05 | End: 2024-04-05

## 2024-04-05 RX ORDER — SODIUM CHLORIDE, SODIUM LACTATE, POTASSIUM CHLORIDE, CALCIUM CHLORIDE 600; 310; 30; 20 MG/100ML; MG/100ML; MG/100ML; MG/100ML
20 INJECTION, SOLUTION INTRAVENOUS CONTINUOUS
Status: DISCONTINUED | OUTPATIENT
Start: 2024-04-05 | End: 2024-04-06 | Stop reason: HOSPADM

## 2024-04-05 RX ORDER — FENTANYL CITRATE/PF 50 MCG/ML
25 SYRINGE (ML) INJECTION
Status: DISCONTINUED | OUTPATIENT
Start: 2024-04-05 | End: 2024-04-05 | Stop reason: HOSPADM

## 2024-04-05 RX ORDER — ROCURONIUM BROMIDE 10 MG/ML
INJECTION, SOLUTION INTRAVENOUS AS NEEDED
Status: DISCONTINUED | OUTPATIENT
Start: 2024-04-05 | End: 2024-04-05

## 2024-04-05 RX ORDER — ACETAMINOPHEN 325 MG/1
650 TABLET ORAL EVERY 4 HOURS PRN
Status: DISCONTINUED | OUTPATIENT
Start: 2024-04-05 | End: 2024-04-06 | Stop reason: HOSPADM

## 2024-04-05 RX ORDER — ATORVASTATIN CALCIUM 40 MG/1
40 TABLET, FILM COATED ORAL DAILY
Status: DISCONTINUED | OUTPATIENT
Start: 2024-04-06 | End: 2024-04-06 | Stop reason: HOSPADM

## 2024-04-05 RX ADMIN — DEXAMETHASONE SODIUM PHOSPHATE 5 MG: 10 INJECTION, SOLUTION INTRAMUSCULAR; INTRAVENOUS at 08:41

## 2024-04-05 RX ADMIN — PROPOFOL 20 MG: 10 INJECTION, EMULSION INTRAVENOUS at 09:33

## 2024-04-05 RX ADMIN — INSULIN LISPRO 1 UNITS: 100 INJECTION, SOLUTION INTRAVENOUS; SUBCUTANEOUS at 17:17

## 2024-04-05 RX ADMIN — TAMSULOSIN HYDROCHLORIDE 0.4 MG: 0.4 CAPSULE ORAL at 21:58

## 2024-04-05 RX ADMIN — FENTANYL CITRATE 25 MCG: 50 INJECTION INTRAMUSCULAR; INTRAVENOUS at 11:02

## 2024-04-05 RX ADMIN — SODIUM CHLORIDE: 0.9 INJECTION, SOLUTION INTRAVENOUS at 08:08

## 2024-04-05 RX ADMIN — FENTANYL CITRATE 100 MCG: 50 INJECTION INTRAMUSCULAR; INTRAVENOUS at 08:00

## 2024-04-05 RX ADMIN — NEOSTIGMINE METHYLSULFATE 2 MG: 1 INJECTION INTRAVENOUS at 10:01

## 2024-04-05 RX ADMIN — ONDANSETRON 4 MG: 2 INJECTION INTRAMUSCULAR; INTRAVENOUS at 08:41

## 2024-04-05 RX ADMIN — FENTANYL CITRATE 25 MCG: 50 INJECTION INTRAMUSCULAR; INTRAVENOUS at 11:33

## 2024-04-05 RX ADMIN — SODIUM CHLORIDE, SODIUM LACTATE, POTASSIUM CHLORIDE, AND CALCIUM CHLORIDE: .6; .31; .03; .02 INJECTION, SOLUTION INTRAVENOUS at 07:55

## 2024-04-05 RX ADMIN — HYDROMORPHONE HYDROCHLORIDE 0.2 MG: 0.2 INJECTION, SOLUTION INTRAMUSCULAR; INTRAVENOUS; SUBCUTANEOUS at 12:12

## 2024-04-05 RX ADMIN — PROPOFOL 90 MG: 10 INJECTION, EMULSION INTRAVENOUS at 08:00

## 2024-04-05 RX ADMIN — HYDROMORPHONE HYDROCHLORIDE 0.5 MG: 1 INJECTION, SOLUTION INTRAMUSCULAR; INTRAVENOUS; SUBCUTANEOUS at 09:02

## 2024-04-05 RX ADMIN — CHLORHEXIDINE GLUCONATE 15 ML: 1.2 SOLUTION ORAL at 06:16

## 2024-04-05 RX ADMIN — GLYCOPYRROLATE 0.4 MG: 0.2 INJECTION, SOLUTION INTRAMUSCULAR; INTRAVENOUS at 10:01

## 2024-04-05 RX ADMIN — MUPIROCIN 1 APPLICATION: 20 OINTMENT TOPICAL at 06:16

## 2024-04-05 RX ADMIN — GABAPENTIN 300 MG: 300 CAPSULE ORAL at 17:16

## 2024-04-05 RX ADMIN — FUROSEMIDE 40 MG: 40 TABLET ORAL at 17:16

## 2024-04-05 RX ADMIN — CEFAZOLIN 2000 MG: 1 INJECTION, POWDER, FOR SOLUTION INTRAMUSCULAR; INTRAVENOUS at 08:36

## 2024-04-05 RX ADMIN — ROCURONIUM BROMIDE 50 MG: 10 INJECTION, SOLUTION INTRAVENOUS at 08:00

## 2024-04-05 RX ADMIN — LIDOCAINE HYDROCHLORIDE 50 MG: 10 INJECTION, SOLUTION EPIDURAL; INFILTRATION; INTRACAUDAL; PERINEURAL at 08:00

## 2024-04-05 NOTE — DISCHARGE SUMMARY
Discharge Summary - Rey Sweeney 78 y.o. male MRN: [id number]    Unit/Bed#: [facility] 501-01 Encounter: [id number]      Admission Date: 4/5/2024   Discharge Date: 4/6/2024    Discharge Diagnosis:   Dilated cardiomyopathy  HTN  HLD  Hx of AVR and MV repair  PAF  DM2  GERD    Procedures Performed:   Lead extraction and upgrade to BiV ICD   Orders Placed This Encounter   Procedures    Cardiac ep lab eps/ablations       Consultants: None    HPI: Please refer to the initial history and physical as well as procedure notes for full details.     Hospital Course: Rey Sweeney presented at his baseline state of health. After the procedure was explained in detail and consent was obtained, he underwent the procedure without complications. He tolerated the procedure well. Chest x-ray immediately following the procedure showed appropriate lead placement without pneumothorax. He was then monitored overnight for further observation.    There were no acute issues or events overnight. The following morning he denied all cardiac complaints, including chest pain/pressure, dyspnea, palpitations, dizziness, lightheadedness, or syncope. His vital signs were reviewed and labs are stable. Telemetry showed paced rhythm. Chest x-ray this morning again showed appropriate device placement without pneumothorax. Device interrogation showed appropriate device function, including lead sensing, threshold, and impedance.     He did develop urinary retention and required straight catheterization overnight.  The on call Urology NP was contacted and recommended continuing home finasteride/flomax, inserting a cannon catheter and following up with his primary urologist in [place] in 1 week for a voiding trial.      His incision was clean, dry, and intact without swelling, hematoma, redness, bleeding, drainage, or signs of infection. Physical exam on the day of discharge was as follows:    Physical Exam  Constitutional:       General: He is not in acute  distress.     Appearance: He is not ill-appearing.   HENT:      Head: Normocephalic and atraumatic.      Mouth/Throat:      Mouth: Mucous membranes are moist.   Cardiovascular:      Rate and Rhythm: Normal rate and regular rhythm.      Comments: Bilateral Aquacell C/D/I without evidence of hematoma   Pulmonary:      Effort: Pulmonary effort is normal. No respiratory distress.   Abdominal:      Comments: Right groin site C/D/I without evidence of hematoma     Skin:     General: Skin is warm and dry.   Neurological:      Mental Status: He is alert.           He was given routine post implantation discharge instructions and restrictions, including wound care, and these were explained in detail.       He was instructed to leave the Aquacell bandage over top of the incision for the full week - keeping the incision dry during that time.. He will follow with his device clinic in [place] in 2 weeks for device interrogation and site check, and he was instructed to follow up with his primary cardiologist as previously instructed.    In terms of his medications, no changes were made.     He is stable for discharge at this time with all questions answered. He was discussed in detail with Dr. Helms who is in agreement with this discharge summary.     Discharge Medications:  See after visit summary for reconciled discharge medications provided to patient and family.      Pertinent Labs/diagnostics:  CBC with diff:   Results from last 7 days   Lab Units 04/06/24 0430   WBC Thousand/uL 14.54*   HEMOGLOBIN g/dL 10.8*   HEMATOCRIT % 33.2*   MCV fL 101*   PLATELETS Thousands/uL 142*   RBC Million/uL 3.29*   MCH pg 32.8   MCHC g/dL 32.5   RDW % 13.6   MPV fL 11.8       BMP:  Results from last 7 days   Lab Units 04/06/24  0430   POTASSIUM mmol/L 4.3   CHLORIDE mmol/L 105   CO2 mmol/L 23   BUN mg/dL 46*   CREATININE mg/dL 2.46*   CALCIUM mg/dL 8.4       Magnesium:       Coags:   Results from last 7 days   Lab Units 04/06/24  0430    INR  1.09         Complications: none    Condition at Discharge: good     Discharge instructions/Information to patient and family:   See after visit summary for information provided to patient and family.      Provisions for Follow-Up Care:  See after visit summary for information related to follow-up care and any pertinent home health orders.      Disposition: Home    Planned Readmission: No    Discharge Statement   I spent 45 minutes minutes discharging the patient. This time was spent on the day of discharge. I had direct contact with the patient on the day of discharge. Additional documentation is required if more than 30 minutes were spent on discharge. Evaluating the incision, discussing discharge instructions and restrictions, arranging follow up appointments, discussing medications    Discharge Medications:  See after visit summary for reconciled discharge medications provided to patient and family.

## 2024-04-05 NOTE — ANESTHESIA POSTPROCEDURE EVALUATION
Post-Op Assessment Note    CV Status:  Stable    Pain management: adequate       Mental Status:  Sleepy and lethargic   Hydration Status:  Stable   PONV Controlled:  None   Airway Patency:  Patent     Post Op Vitals Reviewed: Yes    No anethesia notable event occurred.    Staff: CRNA             BP      Temp      Pulse     Resp      SpO2

## 2024-04-05 NOTE — ANESTHESIA PREPROCEDURE EVALUATION
Procedure:  REMOVAL PACEMAKER/AICD LEADS W LASER / BIV ICD UPGRADE (Chest)  Cardiac laser lead extraction (Chest)    Relevant Problems   CARDIO   (+) CHF (congestive heart failure) ([facility])   (+) History of aortic valve replacement        Physical Exam    Airway    Mallampati score: II  TM Distance: >3 FB  Neck ROM: full     Dental        Cardiovascular  Rhythm: regular, Rate: normal, No murmur, No peripheral edema, No JVD    Pulmonary   Breath sounds clear to auscultation, Breath sounds normal, No wheezes, No rales    Other Findings        Anesthesia Plan  ASA Score- 3     Anesthesia Type- general with ASA Monitors.         Additional Monitors: arterial line.    Airway Plan: ETT.           Plan Factors-Exercise tolerance (METS): >4 METS.    Chart reviewed. EKG reviewed. Imaging results reviewed. Existing labs reviewed. Patient summary reviewed.    Patient is not a current smoker.  Patient did not smoke on day of surgery.            Induction- intravenous.    Postoperative Plan- Plan for postoperative opioid use.     Informed Consent- Anesthetic plan and risks discussed with patient.  I personally reviewed this patient with the CRNA. Discussed and agreed on the Anesthesia Plan with the CRNA..

## 2024-04-05 NOTE — DISCHARGE INSTR - AVS FIRST PAGE
Follow up:  Please call your urologist to schedule a 1 week voiding trial.   If you don't hear from your device clinic, please give them a call early next week to schedule follow up appointment for staple removal in approximately 14 days.     Please refer to post device implantation discharge instructions and restrictions below and your device booklet/temporary card.       RESTRICTIONS:   Groin:  No heavy lifting or strenuous activity for one week.    Do not submerge in any water for one week or until groin heals. You may shower. Please let soap and water run over the groins. Do not scrub the groins. Pat the area dry. You may place band-aid on groins daily for no more than 5 days.    If you notice bleeding, swelling, or large firm lumps near ablation incision, please contact Dr. Rosario's office at [phone].  Chest:  Keep incision dry for one week. Do not use lotions/powders/creams on incision.     Leave outer bandage in place for 1 week - it is water proof, and as long as it is fully adhered to your skin you may shower with it.  If it appears as though the bandage is coming off and/or there is any communication to the area of device incision, please then keep the whole area dry for the remaining week.  After 1 week, please remove by pulling all edges away from the center of the bandage.    No overhead reaching/pushing/pulling/lifting greater than 5-10lbs with left arm for six weeks. Please call the office if you notice redness, swelling, bleeding, or drainage from incision or if you develop fevers    Cardiac Resynchronization Therapy (CRT)    If you have any questions, please call [phone] to speak with a nurse (8:30am-4pm, or [phone] after hours). For appointments, please call [phone].    WHAT YOU SHOULD KNOW:    Your device is a biventricular ICD, which delivers resynchronization therapy and is also a defibrillator (see below). Cardiac resynchronization therapy (CRT) is a procedure used to treat  problems with how your heart contracts. CRT is also called biventricular pacing. Your heart has 2 upper chambers, called atria, and 2 lower chambers, called ventricles. Your heartbeat is synchronized when all areas of your heart contract together properly. When the areas of your heart do not contract as they should, your heart cannot pump enough blood and oxygen to your body. You may have trouble breathing, tire easily, and have swelling in your legs and feet. With a biventricular device, there is one wire in the right atria (in most cases), one wire in the right ventricle, and a third wire that goes through a vein and wraps around to your left ventricle. The two ventricular wires work together to help your heart contract more synchronously and efficiently.               AFTER YOU LEAVE:      Medicines:   Heart medicine may be given to help your heart beat strongly and regularly. Ask your healthcare provider for more information about heart medicines.    Take your medicine as directed. Contact your healthcare provider if you think your medicine is not helping or if you have side effects. Tell him if you are allergic to any medicine. Keep a list of the medicines, vitamins, and herbs you take. Include the amounts, and when and why you take them. Bring the list or the pill bottles to follow-up visits. Carry your medicine list with you in case of an emergency.    Driving: you are ok to drive 48 hours after device is implanted     Follow up with your healthcare provider as directed: You will need to return to have your pacemaker checked. You may also need an EKG, echocardiogram, or chest x-ray to check the leads in your heart, and your doctor will order these tests if necessary. Write down your questions so you remember to ask them during your visits.    Device safety: Some electrical devices may interfere with how your pacemaker works. Some examples are cell phones, security systems, power cables, and electric monitors.  Ask your healthcare provider how long you can be near these devices, and which devices to avoid. Tell caregivers you have a pacemaker before you have a procedure or surgery.    Wound care: Care for your wound as directed. Keep incision dry for one week. Do not use lotions/powders/creams on incision. Leave outer bandage in place for 1 week - it is water proof, and as long as it is fully adhered to your skin you may shower with it.  If it appears as though the bandage is coming off and/or there is any communication to the area of device incision, please then keep the whole area dry for the remaining week.  After 1 week, please remove by pulling all edges away from the center of the bandage. No overhead reaching/pushing/pulling/lifting greater than 5-10lbs with left arm for one month. Please call the office if you notice redness, swelling, bleeding, or drainage from incision or if you develop fevers      Contact your healthcare provider if:   You have new or increased swelling in your legs or feet.   You feel more tired than usual.   You have trouble breathing during activity.    Your wound is red, warm, swollen, or draining pus.   You have a fever.   You have questions or concerns about your condition or care.    Seek care immediately or call 911 if:   You feel like your heart is fluttering or jumping.   You have any of the following signs of a heart attack:    Squeezing, pressure, fullness, or pain in your chest that lasts longer than a few minutes or returns   Discomfort or pain in your back, neck, jaw, stomach, or arm   Shortness of breath or breathing problems   A sudden cold sweat, lightheadedness, dizziness, or nausea, especially with chest pain or trouble breathing      Implantable Cardioverter Defibrillator (ICD)    If you have any questions, please call [phone] to speak with a nurse (8:30am-4pm, or [phone] after hours). For appointments, please call [phone].    WHAT YOU SHOULD KNOW:    Your  device is a biventricular ICD, which delivers resynchronization therapy (see above) and is also a defibrillator. An implantable cardioverter defibrillator (ICD) is a small device that monitors your heart rate and rhythm. It is commonly placed inside your upper chest region. It may be used if you have a ventricular arrhythmia, which is an irregular, dangerous rhythm from the bottom chamber of your heart. Some arrhythmias may cause your heart to suddenly stop beating. An ICD can give a shock to your heart to make it start beating again, or it can give pacing therapy (also known as pain-free therapy) to return your heart to normal rhythm. It is also a pacemaker, so it will pace your heart if needed to prevent it from beating too slowly.           AFTER YOU LEAVE:      Follow up with your primary healthcare provider or cardiologist as directed: You will need to follow-up to have your ICD checked and make sure you are not having problems. Write down your questions so you remember to ask them during your visits.    Self-care:   Ask about activity: Ask if you need to avoid moving your shoulder or arm, and for how long. Ask if you should avoid lifting heavy objects. Do not play any contact sports, such as football or wrestling, until your primary healthcare provider (PHP) or cardiologist tells you it is okay. You may only be able to drive for a certain amount of time per day, or during certain hours. Ask when you can return to work.   Care for your skin over the ICD: see answer in instructions above     When you get a shock from your ICD: A shock may feel like someone has hit you, or you may feel a thump in the chest. If someone is touching you when you get a shock, they may feel a tingling feeling. The first time you feel a shock, it may scare you. Sit or lie down and stay calm. Ask someone to stay with you if possible. Please either call your cardiologist or report to an emergency room.    Safety instructions when you  have an ICD:   Carry an ID card for the ICD: This card has important information about your ICD.    Stay away from magnets or machines with electric fields: This includes MRI machines. Avoid leaning into a car engine or doing welding. These things can interfere with how your ICD works.    Tell airport security you have an ICD: You may need to be searched by hand when you go through a security gate. The security gate or handheld wand could harm your ICD.    Keep an ICD diary: Record when you get a shock and what you were doing before you got the shock. Keep track of how you felt before and after the shock, as well as how many shocks you received. Write down the day and time of each shock. Bring the diary with you when you see your PHP or cardiologist.   Carry medical alert identification: Wear medical alert jewelry or carry a card that says you have an ICD. Ask your PHP or cardiologist where to get these items.    Contact your primary healthcare provider or cardiologist if:   You have a fever.    You feel 1 or more shocks from your ICD and feel fine afterwards.   Your feet or ankles swell.   The area around your ICD is painful or tender after surgery.   The skin around your stitches or staples is red, swollen, or draining pus or fluid.    You have chills, a cough, and feel weak or achy.    You are sad or anxious and find it hard to do your usual activities.   You have questions or concerns about your condition or care.    Seek care immediately or call 911 if:   Your stitches or staples come apart.   Blood soaks through your bandage.   You feel more than 3 shocks in a row from your ICD.   You become weak, dizzy, or faint.   You feel your heart skip beats or beat very fast or slow, but you do not feel a shock from your ICD.   You have chest pain that does not go away with rest or medicine.        © 2014 [employer] Information is for End User's use only and may not be sold, redistributed or otherwise  used for commercial purposes. All illustrations and images included in CareNotes® are the copyrighted property of [employer], [employer] or [employer].  The above information is an  only. It is not intended as medical advice for individual conditions or treatments. Talk to your doctor, nurse or pharmacist before following any medical regimen to see if it is safe and effective for you.

## 2024-04-05 NOTE — ANESTHESIA PROCEDURE NOTES
"Arterial Line Insertion    Performed by: Raman Helm CRNA  Authorized by: Awais Tam MD  Consent: Verbal consent obtained. Written consent obtained.  Risks and benefits: risks, benefits and alternatives were discussed  Consent given by: patient  Patient understanding: patient states understanding of the procedure being performed  Patient consent: the patient's understanding of the procedure matches consent given  Procedure consent: procedure consent matches procedure scheduled  Relevant documents: relevant documents present and verified  Required items: required blood products, implants, devices, and special equipment available  Patient identity confirmed: arm band  Time out: Immediately prior to procedure a \"time out\" was called to verify the correct patient, procedure, equipment, support staff and site/side marked as required.  Preparation: Patient was prepped and draped in the usual sterile fashion.  Indications: hemodynamic monitoring  Orientation:  Right  Location: brachial artery  Procedure Details:  Needle gauge: 20  Seldinger technique: Seldinger technique used  Number of attempts: 2    Post-procedure:  Post-procedure: dressing applied and chlorhexidine patch applied  Waveform: good waveform  Patient tolerance: Patient tolerated the procedure well with no immediate complications  Comments: Ultrasound guidance, micro puncture, CHG dressing          "

## 2024-04-05 NOTE — INTERVAL H&P NOTE
H&P reviewed. After examining the patient I find no changes in the patients condition since the H&P had been written.    Anticipated Length of Stay:  Patient will be admitted on an Inpatient basis with an anticipated length of stay of  greater than 2 midnights.       Justification for Hospital Stay:  Post surgical recovery following open heart surgery.      Vitals:    04/05/24 0558   BP: 123/67   Pulse: (!) 47   Resp: 18   Temp: 97.9 °F (36.6 °C)   SpO2: 98%

## 2024-04-05 NOTE — ANESTHESIA PROCEDURE NOTES
Procedure Performed: SANDRINE Anesthesia  Start Time:  4/5/2024 8:37 AM        Preanesthesia Checklist    Patient identified, IV assessed, risks and benefits discussed, monitors and equipment assessed, procedure being performed at surgeon's request and anesthesia consent obtained.      Procedure    Diagnostic Indications for SANDRINE:  assessment of surgical repair. Type of SANDRINE: interventional SANDRINE with real time guidance of intracardiac procedure. Images Saved: ultrasound permanent image saved. Physician Requesting Echo: Geo Rosario DO.  Location performed: OR. Intubated. Bite block not placed.   Heart visualized. Insertion of SANDRINE Probe:  Atraumatic. Probe Type:  Multiplane.       Echocardiographic and Doppler Measurements    PREPROCEDURE    LEFT VENTRICLE:  Systolic Function: severely impaired. Ejection Fraction: 20%. Cavity size: dilated.   Regional Wall Motion Abnormalities: inf ak, global hk.    RIGHT VENTRICLE:  Systolic Function: mildly impaired.  Cavity size mildly dilated. No hypertrophy              AORTIC VALVE:  Leaflets: bioprosthetic valve. Leaflet motions normal and normal. Stenosis: none.     Regurgitation: none.      MITRAL VALVE:  Leaflets: bioprosthetic valve. Leaflet Motions: normal. Regurgitation: none.   Stenosis: none.       TRICUSPID VALVE:  Leaflets: normal. Leaflet Motions: normal. Stenosis: none. Regurgitation: moderate.      PULMONIC VALVE:  Leaflets: normal. Regurgitation: none. Stenosis: none.        ASCENDING AORTA:  Size:  normal.  Dissection not present.      AORTIC ARCH:  Size:  normal.  dissection not present. Grade 1: normal to mild intimal thickening.    DESCENDING AORTA:  Size: normal.  Dissection not present. Grade 1: normal to mild intimal thickening.        RIGHT ATRIUM:  Size:  left ivc with large CS to RA and dilated. No spontaneous echo contrast.    LEFT ATRIUM:  Size: normal. No spontaneous echo contrast.            ATRIAL SEPTUM:  Intra-atrial septal morphology: normal.           VENTRICULAR SEPTUM:  Intra-ventricular septum morphology: normal.             OTHER FINDINGS:  Pericardium:  normal. Pleural Effusion:  none.        POSTPROCEDURE

## 2024-04-05 NOTE — OP NOTE
OPERATIVE REPORT  PATIENT NAME: Rey Sweeney    :  1945  MRN: [id number]  Pt Location: [facility] OR ROOM 02    SURGERY DATE: 2024    SURGEON: Azeem Yao DO     ASSISTANT: Geo Rosario DO    PREOPERATIVE DIAGNOSIS: Infected pacemaker    POSTOPERATIVE DIAGNOSIS: Infected pacemaker    PROCEDURE: Device removal and lead extraction    ANESTHESIA: General endotracheal anesthesia with transesophageal echocardiogram guidance, Dr. Awais Tam     ESTIMATED BLOOD LOSS: 5 mL    TRANSFUSIONS: None     SPECIMENS: None.     OPERATIVE TECHNIQUE The patient was taken to the operating room and placed supine on the operating table. Following the satisfactory induction of general anesthesia and with monitoring lines already in place, the patient was prepped and draped in the usual sterile fashion. A time-out procedure was performed.     Dr. Rosario accessed the right groin and a temporary pacing wire was placed via the right femoral vein.  A 5cm incision was made overlying the device.  The device and leads were dissected free from surrounding tissues and a purse-string suture was placed.  The patient has a persistent left SVC, a patent right SVC and no innominate vein.  A venogram showed right SVC patency.  Therefore, Dr. Rosario proceeded to place a left sided device.  See his dictation for further details.  Using benedicto traction, the leads were removed without difficulty.  SANDRINE showed no pericardial effusion.  Hemostasis was achieved with electrocautery and the pocket was closed with multiple layers of absorbable suture.     As the attending surgeon, I was present and scrubbed for all critical portions of this procedure. Sponge, needle, and instrument counts were reported as correct by the nursing staff. Final transesophageal echocardiogram demonstrated no pericardial effusion.     SIGNATURE: Azeem Yao DO  DATE: 2024  TIME: 9:17 AM

## 2024-04-06 ENCOUNTER — APPOINTMENT (OUTPATIENT)
Dept: RADIOLOGY | Facility: HOSPITAL | Age: 79
End: 2024-04-06
Payer: MEDICARE

## 2024-04-06 VITALS
TEMPERATURE: 98.7 F | OXYGEN SATURATION: 96 % | WEIGHT: 205.69 LBS | HEIGHT: 73 IN | DIASTOLIC BLOOD PRESSURE: 73 MMHG | BODY MASS INDEX: 27.26 KG/M2 | SYSTOLIC BLOOD PRESSURE: 121 MMHG | RESPIRATION RATE: 19 BRPM | HEART RATE: 60 BPM

## 2024-04-06 LAB
ABO GROUP BLD BPU: NORMAL
ANION GAP SERPL CALCULATED.3IONS-SCNC: 11 MMOL/L (ref 4–13)
BPU ID: NORMAL
BUN SERPL-MCNC: 46 MG/DL (ref 5–25)
CALCIUM SERPL-MCNC: 8.4 MG/DL (ref 8.4–10.2)
CHLORIDE SERPL-SCNC: 105 MMOL/L (ref 96–108)
CO2 SERPL-SCNC: 23 MMOL/L (ref 21–32)
CREAT SERPL-MCNC: 2.46 MG/DL (ref 0.6–1.3)
CROSSMATCH: NORMAL
ERYTHROCYTE [DISTWIDTH] IN BLOOD BY AUTOMATED COUNT: 13.6 % (ref 11.6–15.1)
GFR SERPL CREATININE-BSD FRML MDRD: 24 ML/MIN/1.73SQ M
GLUCOSE SERPL-MCNC: 147 MG/DL (ref 65–140)
GLUCOSE SERPL-MCNC: 167 MG/DL (ref 65–140)
GLUCOSE SERPL-MCNC: 201 MG/DL (ref 65–140)
HCT VFR BLD AUTO: 33.2 % (ref 36.5–49.3)
HGB BLD-MCNC: 10.8 G/DL (ref 12–17)
INR PPP: 1.09 (ref 0.84–1.19)
MCH RBC QN AUTO: 32.8 PG (ref 26.8–34.3)
MCHC RBC AUTO-ENTMCNC: 32.5 G/DL (ref 31.4–37.4)
MCV RBC AUTO: 101 FL (ref 82–98)
PLATELET # BLD AUTO: 142 THOUSANDS/UL (ref 149–390)
PMV BLD AUTO: 11.8 FL (ref 8.9–12.7)
POTASSIUM SERPL-SCNC: 4.3 MMOL/L (ref 3.5–5.3)
PROTHROMBIN TIME: 14 SECONDS (ref 11.6–14.5)
RBC # BLD AUTO: 3.29 MILLION/UL (ref 3.88–5.62)
SODIUM SERPL-SCNC: 139 MMOL/L (ref 135–147)
UNIT DISPENSE STATUS: NORMAL
UNIT PRODUCT CODE: NORMAL
UNIT PRODUCT VOLUME: 350 ML
UNIT RH: NORMAL
WBC # BLD AUTO: 14.54 THOUSAND/UL (ref 4.31–10.16)

## 2024-04-06 PROCEDURE — 71046 X-RAY EXAM CHEST 2 VIEWS: CPT

## 2024-04-06 PROCEDURE — 85027 COMPLETE CBC AUTOMATED: CPT | Performed by: PHYSICIAN ASSISTANT

## 2024-04-06 PROCEDURE — 82948 REAGENT STRIP/BLOOD GLUCOSE: CPT

## 2024-04-06 PROCEDURE — 85610 PROTHROMBIN TIME: CPT | Performed by: PHYSICIAN ASSISTANT

## 2024-04-06 PROCEDURE — NC001 PR NO CHARGE: Performed by: PHYSICIAN ASSISTANT

## 2024-04-06 PROCEDURE — 80048 BASIC METABOLIC PNL TOTAL CA: CPT | Performed by: PHYSICIAN ASSISTANT

## 2024-04-06 RX ADMIN — METOPROLOL SUCCINATE 25 MG: 25 TABLET, FILM COATED, EXTENDED RELEASE ORAL at 08:11

## 2024-04-06 RX ADMIN — POTASSIUM CHLORIDE 10 MEQ: 1.5 SOLUTION ORAL at 08:07

## 2024-04-06 RX ADMIN — ACETAMINOPHEN 650 MG: 325 TABLET, FILM COATED ORAL at 06:29

## 2024-04-06 RX ADMIN — ASPIRIN 81 MG CHEWABLE TABLET 81 MG: 81 TABLET CHEWABLE at 08:11

## 2024-04-06 RX ADMIN — FINASTERIDE 5 MG: 5 TABLET, FILM COATED ORAL at 08:11

## 2024-04-06 RX ADMIN — TAMSULOSIN HYDROCHLORIDE 0.4 MG: 0.4 CAPSULE ORAL at 08:11

## 2024-04-06 RX ADMIN — INSULIN LISPRO 1 UNITS: 100 INJECTION, SOLUTION INTRAVENOUS; SUBCUTANEOUS at 11:06

## 2024-04-06 RX ADMIN — ATORVASTATIN CALCIUM 40 MG: 40 TABLET, FILM COATED ORAL at 08:11

## 2024-04-06 RX ADMIN — GABAPENTIN 300 MG: 300 CAPSULE ORAL at 08:11

## 2024-04-06 RX ADMIN — FUROSEMIDE 40 MG: 40 TABLET ORAL at 08:08

## 2024-04-06 RX ADMIN — PANTOPRAZOLE SODIUM 40 MG: 40 TABLET, DELAYED RELEASE ORAL at 06:29

## 2024-04-06 NOTE — PLAN OF CARE
Problem: PAIN - ADULT  Goal: Verbalizes/displays adequate comfort level or baseline comfort level  Description: Interventions:  - Encourage patient to monitor pain and request assistance  - Assess pain using appropriate pain scale  - Administer analgesics based on type and severity of pain and evaluate response  - Implement non-pharmacological measures as appropriate and evaluate response  - Consider cultural and social influences on pain and pain management  - Notify physician/advanced practitioner if interventions unsuccessful or patient reports new pain  Outcome: Progressing     Problem: INFECTION - ADULT  Goal: Absence or prevention of progression during hospitalization  Description: INTERVENTIONS:  - Assess and monitor for signs and symptoms of infection  - Monitor lab/diagnostic results  - Monitor all insertion sites, i.e. indwelling lines, tubes, and drains  - Monitor endotracheal if appropriate and nasal secretions for changes in amount and color  - [place] appropriate cooling/warming therapies per order  - Administer medications as ordered  - Instruct and encourage patient and family to use good hand hygiene technique  - Identify and instruct in appropriate isolation precautions for identified infection/condition  Outcome: Progressing  Goal: Absence of fever/infection during neutropenic period  Description: INTERVENTIONS:  - Monitor WBC    Outcome: Progressing     Problem: SAFETY ADULT  Goal: Patient will remain free of falls  Description: INTERVENTIONS:  - Educate patient/family on patient safety including physical limitations  - Instruct patient to call for assistance with activity   - Consult OT/PT to assist with strengthening/mobility   - Keep Call bell within reach  - Keep bed low and locked with side rails adjusted as appropriate  - Keep care items and personal belongings within reach  - Initiate and maintain comfort rounds  - Make Fall Risk Sign visible to staff  - Apply yellow socks and bracelet  for high fall risk patients  - Consider moving patient to room near nurses station  Outcome: Progressing  Goal: Maintain or return to baseline ADL function  Description: INTERVENTIONS:  -  Assess patient's ability to carry out ADLs; assess patient's baseline for ADL function and identify physical deficits which impact ability to perform ADLs (bathing, care of mouth/teeth, toileting, grooming, dressing, etc.)  - Assess/evaluate cause of self-care deficits   - Assess range of motion  - Assess patient's mobility; develop plan if impaired  - Assess patient's need for assistive devices and provide as appropriate  - Encourage maximum independence but intervene and supervise when necessary  - Involve family in performance of ADLs  - Assess for home care needs following discharge   - Consider OT consult to assist with ADL evaluation and planning for discharge  - Provide patient education as appropriate  Outcome: Progressing  Goal: Maintains/Returns to pre admission functional level  Description: INTERVENTIONS:  - Perform AM-PAC 6 Click Basic Mobility/ Daily Activity assessment daily.  - Set and communicate daily mobility goal to care team and patient/family/caregiver.   - Collaborate with rehabilitation services on mobility goals if consulted  - Perform Range of Motion 3 times a day.  - Reposition patient every 3 hours.  - Dangle patient 3 times a day  - Stand patient 3 times a day  - Ambulate patient 3 times a day  - Out of bed to chair 3 times a day   - Out of bed for meals 3 times a day  - Out of bed for toileting  - Record patient progress and toleration of activity level   Outcome: Progressing     Problem: DISCHARGE PLANNING  Goal: Discharge to home or other facility with appropriate resources  Description: INTERVENTIONS:  - Identify barriers to discharge w/patient and caregiver  - Arrange for needed discharge resources and transportation as appropriate  - Identify discharge learning needs (meds, wound care, etc.)  -  Arrange for interpretive services to assist at discharge as needed  - Refer to Case Management Department for coordinating discharge planning if the patient needs post-hospital services based on physician/advanced practitioner order or complex needs related to functional status, cognitive ability, or social support system  Outcome: Progressing     Problem: Knowledge Deficit  Goal: Patient/family/caregiver demonstrates understanding of disease process, treatment plan, medications, and discharge instructions  Description: Complete learning assessment and assess knowledge base.  Interventions:  - Provide teaching at level of understanding  - Provide teaching via preferred learning methods  Outcome: Progressing     Problem: CARDIOVASCULAR - ADULT  Goal: Maintains optimal cardiac output and hemodynamic stability  Description: INTERVENTIONS:  - Monitor I/O, vital signs and rhythm  - Monitor for S/S and trends of decreased cardiac output  - Administer and titrate ordered vasoactive medications to optimize hemodynamic stability  - Assess quality of pulses, skin color and temperature  - Assess for signs of decreased coronary artery perfusion  - Instruct patient to report change in severity of symptoms  Outcome: Progressing  Goal: Absence of cardiac dysrhythmias or at baseline rhythm  Description: INTERVENTIONS:  - Continuous cardiac monitoring, vital signs, obtain 12 lead EKG if ordered  - Administer antiarrhythmic and heart rate control medications as ordered  - Monitor electrolytes and administer replacement therapy as ordered  Outcome: Progressing     Problem: GENITOURINARY - ADULT  Goal: Maintains or returns to baseline urinary function  Description: INTERVENTIONS:  - Assess urinary function  - Encourage oral fluids to ensure adequate hydration if ordered  - Administer IV fluids as ordered to ensure adequate hydration  - Administer ordered medications as needed  - Offer frequent toileting  - Follow urinary retention  protocol if ordered  Outcome: Progressing  Goal: Absence of urinary retention  Description: INTERVENTIONS:  - Assess patient’s ability to void and empty bladder  - Monitor I/O  - Bladder scan as needed  - Discuss with physician/AP medications to alleviate retention as needed  - Discuss catheterization for long term situations as appropriate  Outcome: Progressing  Goal: Urinary catheter remains patent  Description: INTERVENTIONS:  - Assess patency of urinary catheter  - If patient has a chronic cannon, consider changing catheter if non-functioning  - Follow guidelines for intermittent irrigation of non-functioning urinary catheter  Outcome: Progressing     Problem: METABOLIC, FLUID AND ELECTROLYTES - ADULT  Goal: Electrolytes maintained within normal limits  Description: INTERVENTIONS:  - Monitor labs and assess patient for signs and symptoms of electrolyte imbalances  - Administer electrolyte replacement as ordered  - Monitor response to electrolyte replacements, including repeat lab results as appropriate  - Instruct patient on fluid and nutrition as appropriate  Outcome: Progressing  Goal: Fluid balance maintained  Description: INTERVENTIONS:  - Monitor labs   - Monitor I/O and WT  - Instruct patient on fluid and nutrition as appropriate  - Assess for signs & symptoms of volume excess or deficit  Outcome: Progressing  Goal: Glucose maintained within target range  Description: INTERVENTIONS:  - Monitor Blood Glucose as ordered  - Assess for signs and symptoms of hyperglycemia and hypoglycemia  - Administer ordered medications to maintain glucose within target range  - Assess nutritional intake and initiate nutrition service referral as needed  Outcome: Progressing

## 2024-04-06 NOTE — PROGRESS NOTES
Patient educated on use of leg bag for cannon. Switched regular cannon bag to leg bag for discharge.

## (undated) DEVICE — INTRO SHEATH PEEL AWAY 9 FR

## (undated) DEVICE — SCD SEQUENTIAL COMPRESSION COMFORT SLEEVE LARGE KNEE LENGTH: Brand: KENDALL SCD

## (undated) DEVICE — PERCUTANEOUS ENTRY THINWALL NEEDLE  ONE-PART: Brand: COOK

## (undated) DEVICE — ELECTRODE EZ CLEAN BLADE -0012

## (undated) DEVICE — INTENDED FOR TISSUE SEPARATION, AND OTHER PROCEDURES THAT REQUIRE A SHARP SURGICAL BLADE TO PUNCTURE OR CUT.: Brand: BARD-PARKER ® CARBON RIB-BACK BLADES

## (undated) DEVICE — PLUMEPEN PRO 10FT

## (undated) DEVICE — DRAPE SHEET THREE QUARTER

## (undated) DEVICE — STRL BETHLACCESS CATHETER PACK: Brand: CARDINAL HEALTH

## (undated) DEVICE — SUT SILK 2 60 IN SA8H

## (undated) DEVICE — GLOVE SRG BIOGEL ECLIPSE 8

## (undated) DEVICE — BULB SYRINGE,IRRIGATION WITH PROTECTIVE CAP: Brand: DOVER

## (undated) DEVICE — CATH EP 5FR QUAD SUPREME JSN

## (undated) DEVICE — GAUZE SPONGES,USP TYPE VII GAUZE, 12 PLY: Brand: CURITY

## (undated) DEVICE — DEFIB ADULT ELECTRODE CARDINAL

## (undated) DEVICE — CHLORAPREP HI-LITE 26ML ORANGE

## (undated) DEVICE — PENCIL ELECTROSURG E-Z CLEAN -0035H

## (undated) DEVICE — 3M™ IOBAN™ 2 ANTIMICROBIAL INCISE DRAPE 6651EZ: Brand: IOBAN™ 2

## (undated) DEVICE — MEDI-VAC YANK SUCT HNDL W/TPRD BULBOUS TIP: Brand: CARDINAL HEALTH

## (undated) DEVICE — PANNUS RETENTION SYSTEM

## (undated) DEVICE — TUBING SUCTION 5MM X 12 FT

## (undated) DEVICE — SYRINGE 10ML SLIP TIP LF

## (undated) DEVICE — IV EXTENSION TUBING 33 IN

## (undated) DEVICE — 3M™ TEGADERM™ TRANSPARENT FILM DRESSING FRAME STYLE, 1626W, 4 IN X 4-3/4 IN (10 CM X 12 CM), 50/CT 4CT/CASE: Brand: 3M™ TEGADERM™

## (undated) DEVICE — GUIDE SHEATH 9FR ATTAIN COMMAND 6250-MPR

## (undated) DEVICE — DRAPE SURGIKIT SADDLE BAG

## (undated) DEVICE — LEAD CUTTER: Brand: LEAD CUTTER

## (undated) DEVICE — SAFE SHEATH INTRODUCER 9FR X-LONG

## (undated) DEVICE — DISPOSABLE ADAPTER

## (undated) DEVICE — PROBE COVER: Brand: STERILE PROBE COVER

## (undated) DEVICE — RUNTHROUGH NS EXTRA FLOPPY PTCA GUIDEWIRE: Brand: RUNTHROUGH

## (undated) DEVICE — 3M™ STERI-STRIP™ REINFORCED ADHESIVE SKIN CLOSURES, R1547, 1/2 IN X 4 IN (12 MM X 100 MM), 6 STRIPS/ENVELOPE: Brand: 3M™ STERI-STRIP™

## (undated) DEVICE — LIGHT HANDLE COVER SLEEVE DISP BLUE STELLAR

## (undated) DEVICE — DRAPE C-ARM BAG/DOME XR ELSTIC OPEN LF

## (undated) DEVICE — SNAP KOVER: Brand: UNBRANDED

## (undated) DEVICE — GLOVE INDICATOR PI UNDERGLOVE SZ 8 BLUE

## (undated) DEVICE — DRESSING AQUACEL AG 3.5 X 6 IN

## (undated) DEVICE — TRAY FOLEY 16FR SURESTEP TEMP SENS URIMETER STAT LOK

## (undated) DEVICE — CLAMP 28MM F/SM FRAG

## (undated) DEVICE — SUT VICRYL PLUS 2-0 CTB-1 27 IN VCPB259H

## (undated) DEVICE — ANTIBACTERIAL UNDYED BRAIDED (POLYGLACTIN 910), SYNTHETIC ABSORBABLE SUTURE: Brand: COATED VICRYL

## (undated) DEVICE — GUIDE SHEATH 7FR 90 D ATTAIN SELECT II SUREVALVE

## (undated) DEVICE — Device: Brand: WEBSTER

## (undated) DEVICE — GLOVE SRG BIOGEL ORTHOPEDIC 7.5

## (undated) DEVICE — SUT MONOCRYL 4-0 PS-2 18 IN Y496G

## (undated) DEVICE — DISPOSABLE EQUIPMENT COVER: Brand: LARGE TOWEL DRAPE

## (undated) DEVICE — INTRO SHEATH PEEL AWAY 7FR

## (undated) DEVICE — SAFE SHEATH INTRODUCER 7FR LONG

## (undated) DEVICE — CARDIOVASCULAR SPLIT DRAPE: Brand: CONVERTORS

## (undated) DEVICE — PINNACLE INTRODUCER SHEATH: Brand: PINNACLE

## (undated) DEVICE — 3M™ TEGADERM™ CHG DRESSING 25/CARTON 4 CARTONS/CASE 1659: Brand: TEGADERM™

## (undated) DEVICE — PAD GROUNDING DUAL ADULT

## (undated) DEVICE — SUT SILK 0 CT-1 30 IN 424H

## (undated) DEVICE — 3M™ IOBAN™ 2 ANTIMICROBIAL INCISE DRAPE 6650EZ: Brand: IOBAN™ 2

## (undated) DEVICE — SPONGE LAP 18 X 18 IN